# Patient Record
Sex: MALE | Race: WHITE | Employment: OTHER | URBAN - METROPOLITAN AREA
[De-identification: names, ages, dates, MRNs, and addresses within clinical notes are randomized per-mention and may not be internally consistent; named-entity substitution may affect disease eponyms.]

---

## 2019-06-17 ENCOUNTER — APPOINTMENT (OUTPATIENT)
Dept: ULTRASOUND IMAGING | Age: 79
End: 2019-06-17
Payer: COMMERCIAL

## 2019-06-17 ENCOUNTER — APPOINTMENT (OUTPATIENT)
Dept: CT IMAGING | Age: 79
End: 2019-06-17
Payer: COMMERCIAL

## 2019-06-17 ENCOUNTER — HOSPITAL ENCOUNTER (EMERGENCY)
Age: 79
Discharge: HOME | End: 2019-06-17
Attending: EMERGENCY MEDICINE
Payer: COMMERCIAL

## 2019-06-17 ENCOUNTER — APPOINTMENT (OUTPATIENT)
Dept: GENERAL RADIOLOGY | Age: 79
End: 2019-06-17
Payer: COMMERCIAL

## 2019-06-17 VITALS
DIASTOLIC BLOOD PRESSURE: 71 MMHG | SYSTOLIC BLOOD PRESSURE: 139 MMHG | HEART RATE: 82 BPM | OXYGEN SATURATION: 93 % | RESPIRATION RATE: 16 BRPM | WEIGHT: 210 LBS | BODY MASS INDEX: 29.4 KG/M2 | HEIGHT: 71 IN | TEMPERATURE: 98.4 F

## 2019-06-17 DIAGNOSIS — N18.9 CHRONIC KIDNEY DISEASE, UNSPECIFIED CKD STAGE: ICD-10-CM

## 2019-06-17 DIAGNOSIS — I82.621 ARM DVT (DEEP VENOUS THROMBOEMBOLISM), ACUTE, RIGHT (HCC): Primary | ICD-10-CM

## 2019-06-17 DIAGNOSIS — J45.901 EXACERBATION OF ASTHMA, UNSPECIFIED ASTHMA SEVERITY, UNSPECIFIED WHETHER PERSISTENT: ICD-10-CM

## 2019-06-17 LAB
ALBUMIN SERPL-MCNC: 4 G/DL (ref 3.5–5.2)
ALP BLD-CCNC: 62 U/L (ref 40–129)
ALT SERPL-CCNC: 17 U/L (ref 0–40)
ANION GAP SERPL CALCULATED.3IONS-SCNC: 17 MMOL/L (ref 7–16)
AST SERPL-CCNC: 17 U/L (ref 0–39)
BASOPHILS ABSOLUTE: 0.03 E9/L (ref 0–0.2)
BASOPHILS RELATIVE PERCENT: 0.3 % (ref 0–2)
BILIRUB SERPL-MCNC: 0.8 MG/DL (ref 0–1.2)
BUN BLDV-MCNC: 37 MG/DL (ref 8–23)
CALCIUM SERPL-MCNC: 9.5 MG/DL (ref 8.6–10.2)
CHLORIDE BLD-SCNC: 95 MMOL/L (ref 98–107)
CO2: 23 MMOL/L (ref 22–29)
CREAT SERPL-MCNC: 1.9 MG/DL (ref 0.7–1.2)
EOSINOPHILS ABSOLUTE: 0.03 E9/L (ref 0.05–0.5)
EOSINOPHILS RELATIVE PERCENT: 0.3 % (ref 0–6)
GFR AFRICAN AMERICAN: 42
GFR NON-AFRICAN AMERICAN: 35 ML/MIN/1.73
GLUCOSE BLD-MCNC: 111 MG/DL (ref 74–99)
HCT VFR BLD CALC: 40.6 % (ref 37–54)
HEMOGLOBIN: 13.9 G/DL (ref 12.5–16.5)
IMMATURE GRANULOCYTES #: 0.05 E9/L
IMMATURE GRANULOCYTES %: 0.4 % (ref 0–5)
LYMPHOCYTES ABSOLUTE: 1.9 E9/L (ref 1.5–4)
LYMPHOCYTES RELATIVE PERCENT: 16.7 % (ref 20–42)
MCH RBC QN AUTO: 33.3 PG (ref 26–35)
MCHC RBC AUTO-ENTMCNC: 34.2 % (ref 32–34.5)
MCV RBC AUTO: 97.1 FL (ref 80–99.9)
MONOCYTES ABSOLUTE: 1.87 E9/L (ref 0.1–0.95)
MONOCYTES RELATIVE PERCENT: 16.4 % (ref 2–12)
NEUTROPHILS ABSOLUTE: 7.5 E9/L (ref 1.8–7.3)
NEUTROPHILS RELATIVE PERCENT: 65.9 % (ref 43–80)
PDW BLD-RTO: 11.6 FL (ref 11.5–15)
PLATELET # BLD: 280 E9/L (ref 130–450)
PMV BLD AUTO: 9.6 FL (ref 7–12)
POTASSIUM SERPL-SCNC: 4.2 MMOL/L (ref 3.5–5)
RBC # BLD: 4.18 E12/L (ref 3.8–5.8)
RBC # BLD: NORMAL 10*6/UL
SODIUM BLD-SCNC: 135 MMOL/L (ref 132–146)
TOTAL PROTEIN: 7.5 G/DL (ref 6.4–8.3)
TROPONIN: <0.01 NG/ML (ref 0–0.03)
WBC # BLD: 11.4 E9/L (ref 4.5–11.5)

## 2019-06-17 PROCEDURE — 6360000002 HC RX W HCPCS: Performed by: EMERGENCY MEDICINE

## 2019-06-17 PROCEDURE — 2580000003 HC RX 258: Performed by: EMERGENCY MEDICINE

## 2019-06-17 PROCEDURE — 85025 COMPLETE CBC W/AUTO DIFF WBC: CPT

## 2019-06-17 PROCEDURE — 70450 CT HEAD/BRAIN W/O DYE: CPT

## 2019-06-17 PROCEDURE — 73130 X-RAY EXAM OF HAND: CPT

## 2019-06-17 PROCEDURE — 71045 X-RAY EXAM CHEST 1 VIEW: CPT

## 2019-06-17 PROCEDURE — 94664 DEMO&/EVAL PT USE INHALER: CPT

## 2019-06-17 PROCEDURE — 6370000000 HC RX 637 (ALT 250 FOR IP): Performed by: EMERGENCY MEDICINE

## 2019-06-17 PROCEDURE — 96372 THER/PROPH/DIAG INJ SC/IM: CPT

## 2019-06-17 PROCEDURE — 80053 COMPREHEN METABOLIC PANEL: CPT

## 2019-06-17 PROCEDURE — 93005 ELECTROCARDIOGRAM TRACING: CPT | Performed by: EMERGENCY MEDICINE

## 2019-06-17 PROCEDURE — 84484 ASSAY OF TROPONIN QUANT: CPT

## 2019-06-17 PROCEDURE — 99284 EMERGENCY DEPT VISIT MOD MDM: CPT

## 2019-06-17 PROCEDURE — 73060 X-RAY EXAM OF HUMERUS: CPT

## 2019-06-17 PROCEDURE — 96374 THER/PROPH/DIAG INJ IV PUSH: CPT

## 2019-06-17 PROCEDURE — 93971 EXTREMITY STUDY: CPT

## 2019-06-17 PROCEDURE — 94640 AIRWAY INHALATION TREATMENT: CPT

## 2019-06-17 PROCEDURE — 73090 X-RAY EXAM OF FOREARM: CPT

## 2019-06-17 RX ORDER — METHYLPREDNISOLONE SODIUM SUCCINATE 125 MG/2ML
125 INJECTION, POWDER, LYOPHILIZED, FOR SOLUTION INTRAMUSCULAR; INTRAVENOUS ONCE
Status: COMPLETED | OUTPATIENT
Start: 2019-06-17 | End: 2019-06-17

## 2019-06-17 RX ORDER — IPRATROPIUM BROMIDE AND ALBUTEROL SULFATE 2.5; .5 MG/3ML; MG/3ML
1 SOLUTION RESPIRATORY (INHALATION)
Status: COMPLETED | OUTPATIENT
Start: 2019-06-17 | End: 2019-06-17

## 2019-06-17 RX ORDER — ALBUTEROL SULFATE 90 UG/1
2 AEROSOL, METERED RESPIRATORY (INHALATION) EVERY 6 HOURS PRN
Qty: 1 INHALER | Refills: 0 | Status: SHIPPED | OUTPATIENT
Start: 2019-06-17 | End: 2019-08-22

## 2019-06-17 RX ORDER — 0.9 % SODIUM CHLORIDE 0.9 %
500 INTRAVENOUS SOLUTION INTRAVENOUS ONCE
Status: COMPLETED | OUTPATIENT
Start: 2019-06-17 | End: 2019-06-17

## 2019-06-17 RX ORDER — PREDNISONE 20 MG/1
TABLET ORAL
Qty: 10 TABLET | Refills: 0 | Status: SHIPPED | OUTPATIENT
Start: 2019-06-17 | End: 2019-06-24

## 2019-06-17 RX ADMIN — SODIUM CHLORIDE 500 ML: 9 INJECTION, SOLUTION INTRAVENOUS at 22:54

## 2019-06-17 RX ADMIN — ENOXAPARIN SODIUM 100 MG: 100 INJECTION SUBCUTANEOUS at 22:56

## 2019-06-17 RX ADMIN — METHYLPREDNISOLONE SODIUM SUCCINATE 125 MG: 125 INJECTION, POWDER, FOR SOLUTION INTRAMUSCULAR; INTRAVENOUS at 22:02

## 2019-06-17 RX ADMIN — IPRATROPIUM BROMIDE AND ALBUTEROL SULFATE 1 AMPULE: .5; 3 SOLUTION RESPIRATORY (INHALATION) at 22:17

## 2019-06-17 RX ADMIN — IPRATROPIUM BROMIDE AND ALBUTEROL SULFATE 1 AMPULE: .5; 3 SOLUTION RESPIRATORY (INHALATION) at 22:14

## 2019-06-17 RX ADMIN — IPRATROPIUM BROMIDE AND ALBUTEROL SULFATE 1 AMPULE: .5; 3 SOLUTION RESPIRATORY (INHALATION) at 22:10

## 2019-06-17 ASSESSMENT — ENCOUNTER SYMPTOMS
CONSTIPATION: 0
WHEEZING: 0
COUGH: 0
VISUAL CHANGE: 0
EYE PAIN: 0
RHINORRHEA: 0
VOMITING: 0
SINUS PRESSURE: 0
EYE DISCHARGE: 0
SHORTNESS OF BREATH: 0
EYE REDNESS: 0
SORE THROAT: 0
NAUSEA: 0
BLOOD IN STOOL: 0
BACK PAIN: 0
ABDOMINAL PAIN: 0
DIARRHEA: 0

## 2019-06-17 ASSESSMENT — PAIN DESCRIPTION - PAIN TYPE: TYPE: ACUTE PAIN

## 2019-06-17 ASSESSMENT — PAIN DESCRIPTION - ORIENTATION: ORIENTATION: RIGHT

## 2019-06-17 ASSESSMENT — PAIN - FUNCTIONAL ASSESSMENT: PAIN_FUNCTIONAL_ASSESSMENT: ACTIVITIES ARE NOT PREVENTED

## 2019-06-17 ASSESSMENT — PAIN DESCRIPTION - FREQUENCY: FREQUENCY: CONTINUOUS

## 2019-06-17 ASSESSMENT — PAIN DESCRIPTION - DESCRIPTORS: DESCRIPTORS: PATIENT UNABLE TO DESCRIBE

## 2019-06-17 ASSESSMENT — PAIN DESCRIPTION - LOCATION: LOCATION: ARM

## 2019-06-17 ASSESSMENT — PAIN SCALES - GENERAL: PAINLEVEL_OUTOF10: 3

## 2019-06-17 ASSESSMENT — PAIN DESCRIPTION - ONSET: ONSET: SUDDEN

## 2019-06-18 LAB
EKG ATRIAL RATE: 65 BPM
EKG P AXIS: 8 DEGREES
EKG P-R INTERVAL: 188 MS
EKG Q-T INTERVAL: 396 MS
EKG QRS DURATION: 94 MS
EKG QTC CALCULATION (BAZETT): 411 MS
EKG R AXIS: -43 DEGREES
EKG T AXIS: 11 DEGREES
EKG VENTRICULAR RATE: 65 BPM

## 2019-06-18 PROCEDURE — 93010 ELECTROCARDIOGRAM REPORT: CPT | Performed by: INTERNAL MEDICINE

## 2019-06-18 NOTE — ED PROVIDER NOTES
Patient is a 70years old male with history of dementia, asthma, and HTN here with 3 days of right arm weakness and fatigue. Wife states starting yesterday patient also had a fever of about 101. She states today patient's right arm started swelling. Patient complains of right hand and arm pain. Patient states nothing makes her symptoms worse or better. Patient denies history of strokes/TIAs, history of heart problems, history of blood clots/PE/DVT, history of cancer, recent travel/surgeries/hospitalizations, falls, injuries, chills, sweats, chest pain/pressure, shortness of breath, change in vision/hearing, numbness/tingling, nausea/vomiting, abdominal pain, change in hearing/vision, loss of consciousness, headache, neck pain/stiffness, bowel/bladder dysfunction. Wife states that patient seems to be at his baseline. Patient states that he is right-handed. Extremity Weakness   Duration:  3 days  Chronicity:  New  Relieved by:  Nothing  Worsened by:  Nothing  Ineffective treatments:  None tried  Associated symptoms: arthralgias, fever and myalgias    Associated symptoms: no abdominal pain, no aphasia, no ataxia, no chest pain, no cough, no diarrhea, no difficulty walking, no dizziness, no dysuria, no falls, no frequency, no headaches, no loss of consciousness, no melena, no nausea, no near-syncope, no seizures, no shortness of breath, no syncope, no vision change and no vomiting        Review of Systems   Constitutional: Positive for fever. Negative for chills and diaphoresis. HENT: Negative for ear pain, hearing loss, rhinorrhea, sinus pressure and sore throat. Eyes: Negative for pain, discharge, redness and visual disturbance. Respiratory: Negative for cough, shortness of breath and wheezing. Cardiovascular: Negative for chest pain, syncope and near-syncope. Gastrointestinal: Negative for abdominal pain, blood in stool, constipation, diarrhea, melena, nausea and vomiting.    Genitourinary: Negative for dysuria, flank pain, frequency and hematuria. Musculoskeletal: Positive for arthralgias and myalgias. Negative for back pain, falls, neck pain and neck stiffness. Skin: Negative for rash and wound. Neurological: Negative for dizziness, seizures, loss of consciousness, syncope, weakness, light-headedness and headaches. Hematological: Negative for adenopathy. All other systems reviewed and are negative. Physical Exam   Constitutional: He is oriented to person, place, and time. He appears well-developed and well-nourished. HENT:   Head: Normocephalic and atraumatic. Head is without raccoon's eyes and without Hernandez's sign. Nose: Nose normal.   Mouth/Throat: Uvula is midline, oropharynx is clear and moist and mucous membranes are normal.   Eyes: Pupils are equal, round, and reactive to light. Conjunctivae, EOM and lids are normal.   Neck: Trachea normal and normal range of motion. Neck supple. No JVD present. Cardiovascular: Normal rate, regular rhythm and normal heart sounds. Pulmonary/Chest: Effort normal and breath sounds normal. No respiratory distress. He has no wheezes. He has no rales. Abdominal: Soft. Bowel sounds are normal. There is no tenderness. There is no rebound, no guarding and no CVA tenderness. Musculoskeletal: He exhibits no edema. Right hand swelling with mild erythema. Right upper extremity tenderness to palpation and pain with movement. .   Neurological: He is alert and oriented to person, place, and time. No cranial nerve deficit or sensory deficit. Coordination normal. GCS eye subscore is 4. GCS verbal subscore is 5. GCS motor subscore is 6. Alert and oriented x3. Sensation intact and equal bilateral upper and lower extremities. Muscle strength 5/5 bilateral lower and left upper extremity. Muscle strength 4/5 right upper extremity. Skin: Skin is warm and dry. Nursing note and vitals reviewed.       Procedures    MDM  Number of Diagnoses or Management Options  Arm DVT (deep venous thromboembolism), acute, right ([address]):   Chronic kidney disease, unspecified CKD stage:   Exacerbation of asthma, unspecified asthma severity, unspecified whether persistent:   Diagnosis management comments: Patient and wife state that they are from [place] and would like to be discharged home. They were offered admission to the hospital but refused. They state that patient always has had reduced kidney function. Patient's calculated CrCl is >35 ml/min. He is given a dose of Lovenox in the ED. Patient is started on Eliquis for his RUE DVT. He denies shortness of breath but had wheezing and was given treatment he is discharged in stable condition with instructions to follow-up with his PCP or return to ED anytime if his symptoms worsen. EKG Interpretation. EKG: This EKG is signed and interpreted by me. Rate: 65  Rhythm: Sinus  Interpretation: non-specific T-wave changes; LAD; LAFB  Comparison: no previous EKG            --------------------------------------------- PAST HISTORY ---------------------------------------------  Past Medical History:  has a past medical history of Asthma, Dementia, and Hypertension. Past Surgical History:  has no past surgical history on file. Social History:  reports that he has never smoked. He has never used smokeless tobacco. He reports that he drinks alcohol. Family History: family history is not on file. The patients home medications have been reviewed.     Allergies: Aspirin    -------------------------------------------------- RESULTS -------------------------------------------------  Labs:  Results for orders placed or performed during the hospital encounter of 06/17/19   CBC Auto Differential   Result Value Ref Range    WBC 11.4 4.5 - 11.5 E9/L    RBC 4.18 3.80 - 5.80 E12/L    Hemoglobin 13.9 12.5 - 16.5 g/dL    Hematocrit 40.6 37.0 - 54.0 %    MCV 97.1 80.0 - 99.9 fL    MCH 33.3 26.0 - 35.0 pg    MCHC 34.2 32.0 - 34.5 %    RDW 11.6 11.5 - 15.0 fL    Platelets 811 100 - 908 E9/L    MPV 9.6 7.0 - 12.0 fL    Neutrophils % 65.9 43.0 - 80.0 %    Immature Granulocytes % 0.4 0.0 - 5.0 %    Lymphocytes % 16.7 (L) 20.0 - 42.0 %    Monocytes % 16.4 (H) 2.0 - 12.0 %    Eosinophils % 0.3 0.0 - 6.0 %    Basophils % 0.3 0.0 - 2.0 %    Neutrophils # 7.50 (H) 1.80 - 7.30 E9/L    Immature Granulocytes # 0.05 E9/L    Lymphocytes # 1.90 1.50 - 4.00 E9/L    Monocytes # 1.87 (H) 0.10 - 0.95 E9/L    Eosinophils # 0.03 (L) 0.05 - 0.50 E9/L    Basophils # 0.03 0.00 - 0.20 E9/L    RBC Morphology Normal    Comprehensive Metabolic Panel   Result Value Ref Range    Sodium 135 132 - 146 mmol/L    Potassium 4.2 3.5 - 5.0 mmol/L    Chloride 95 (L) 98 - 107 mmol/L    CO2 23 22 - 29 mmol/L    Anion Gap 17 (H) 7 - 16 mmol/L    Glucose 111 (H) 74 - 99 mg/dL    BUN 37 (H) 8 - 23 mg/dL    CREATININE 1.9 (H) 0.7 - 1.2 mg/dL    GFR Non-African American 35 >=60 mL/min/1.73    GFR African American 42     Calcium 9.5 8.6 - 10.2 mg/dL    Total Protein 7.5 6.4 - 8.3 g/dL    Alb 4.0 3.5 - 5.2 g/dL    Total Bilirubin 0.8 0.0 - 1.2 mg/dL    Alkaline Phosphatase 62 40 - 129 U/L    ALT 17 0 - 40 U/L    AST 17 0 - 39 U/L   Troponin   Result Value Ref Range    Troponin <0.01 0.00 - 0.03 ng/mL   EKG 12 Lead   Result Value Ref Range    Ventricular Rate 65 BPM    Atrial Rate 65 BPM    P-R Interval 188 ms    QRS Duration 94 ms    Q-T Interval 396 ms    QTc Calculation (Bazett) 411 ms    P Axis 8 degrees    R Axis -43 degrees    T Axis 11 degrees       Radiology:  XR HAND RIGHT (MIN 3 VIEWS)   Final Result      No fracture or dislocation. XR RADIUS ULNA RIGHT (2 VIEWS)   Final Result   No significant abnormal findings at the right humerus or at the right   forearm. XR HUMERUS RIGHT (MIN 2 VIEWS)   Final Result   No significant abnormal findings at the right humerus or at the right   forearm.       XR CHEST PORTABLE   Final Result No airspace opacities or pleural effusion. CT Head WO Contrast   Final Result   Atrophy and mild chronic microvascular ischemic disease. Inflammatory   changes in the paranasal sinuses as noted. US DUP UPPER EXTREMITY RIGHT VENOUS   Final Result   Occlusive thrombus in the right brachial vein.             ------------------------- NURSING NOTES AND VITALS REVIEWED ---------------------------  Date / Time Roomed:  6/17/2019  8:26 PM  ED Bed Assignment:  12/12    The nursing notes within the ED encounter and vital signs as below have been reviewed. /71   Pulse 82   Temp 98.4 °F (36.9 °C) (Oral)   Resp 16   Ht 5' 11\" (1.803 m)   Wt 210 lb (95.3 kg)   SpO2 93%   BMI 29.29 kg/m²   Oxygen Saturation Interpretation: Normal      ------------------------------------------ PROGRESS NOTES ------------------------------------------  10:11 PM  Patient states he feels better. He states he would like to be discharged. 10:46 PM  I have spoken with the patient and discussed todays results, in addition to providing specific details for the plan of care and counseling regarding the diagnosis and prognosis. Their questions are answered at this time and they are agreeable with the plan. I discussed at length with them reasons for immediate return here for re evaluation. They will followup with their primary care physician by calling their office tomorrow and on Monday.      --------------------------------- ADDITIONAL PROVIDER NOTES ---------------------------------  At this time the patient is without objective evidence of an acute process requiring hospitalization or inpatient management. They have remained hemodynamically stable throughout their entire ED visit and are stable for discharge with outpatient follow-up. The plan has been discussed in detail and they are aware of the specific conditions for emergent return, as well as the importance of follow-up.       Discharge Medication List as of 6/17/2019 11:32 PM      START taking these medications    Details   albuterol sulfate HFA (PROAIR HFA) 108 (90 Base) MCG/ACT inhaler Inhale 2 puffs into the lungs every 6 hours as needed for Wheezing, Disp-1 Inhaler, R-0Print      predniSONE (DELTASONE) 20 MG tablet Take 40mg daily for 4 days, Disp-10 tablet, R-0Print      apixaban (ELIQUIS STARTER PACK) 5 MG TABS tablet Take 10 mg (2 tablets) orally twice daily for 7 days, then take 5 mg (1 tablet) orally twice daily thereafter., Disp-74 tablet, R-0Print             Diagnosis:  1. Arm DVT (deep venous thromboembolism), acute, right ([facility] [address]    2. Exacerbation of asthma, unspecified asthma severity, unspecified whether persistent    3. Chronic kidney disease, unspecified CKD stage        Disposition:  Patient's disposition: Discharge to home  Patient's condition is stable.          Brianna Michael,   Resident  06/18/19 [id number]

## 2019-07-11 ENCOUNTER — OFFICE VISIT (OUTPATIENT)
Dept: VASCULAR SURGERY | Age: 79
End: 2019-07-11
Payer: COMMERCIAL

## 2019-07-11 VITALS — DIASTOLIC BLOOD PRESSURE: 64 MMHG | HEART RATE: 60 BPM | SYSTOLIC BLOOD PRESSURE: 108 MMHG

## 2019-07-11 DIAGNOSIS — I82.621 ACUTE DEEP VEIN THROMBOSIS (DVT) OF BRACHIAL VEIN OF RIGHT UPPER EXTREMITY (HCC): ICD-10-CM

## 2019-07-11 PROCEDURE — 99204 OFFICE O/P NEW MOD 45 MIN: CPT | Performed by: SURGERY

## 2019-07-11 NOTE — PROGRESS NOTES
 Transportation needs:     Medical: Not on file     Non-medical: Not on file   Tobacco Use    Smoking status: Never Smoker    Smokeless tobacco: Never Used   Substance and Sexual Activity    Alcohol use: Yes     Drinks per session: 1 or 2     Binge frequency: Weekly     Comment: wine    Drug use: Not on file    Sexual activity: Not on file   Lifestyle    Physical activity:     Days per week: Not on file     Minutes per session: Not on file    Stress: Not on file   Relationships    Social connections:     Talks on phone: Not on file     Gets together: Not on file     Attends Restorationism service: Not on file     Active member of club or organization: Not on file     Attends meetings of clubs or organizations: Not on file     Relationship status: Not on file    Intimate partner violence:     Fear of current or ex partner: Not on file     Emotionally abused: Not on file     Physically abused: Not on file     Forced sexual activity: Not on file   Other Topics Concern    Not on file   Social History Narrative    Not on file       Review of Systems:  Skin:  No abnormal pigmentation or rash  Eyes:  No blurring, diplopia or vision loss  Ears/Nose/Throat:  No hearing loss or vertigo  Respiratory:  No cough, pleuritic chest pain, dyspnea, or wheezing. Cardiovascular: No angina, palpitations . Gastrointestinal:  No nausea or vomiting; no abdominal pain or rectal bleeding  Musculoskeletal:  No arthritis or weakness. Neurologic:  No paralysis, paresis, paresthesia, seizures or headaches  Hematologic/Lymphatic/Immunologic:  No anemia, abnormal bleeding/bruising, fever, chills or night sweats. Endocrine:  No heat or cold intolerance. No polyphagia, polydipsia or polyuria. Physical Exam:  General appearance:  Alert, awake, oriented x 3. No distress. Skin:  Warm and dry  Head:  Normocephalic.   No masses, lesions or tenderness  Eyes:  Conjunctivae appear normal; PERRL  Ears:  External ears normal  Nose/Sinuses: if symptoms worsen or fail to improve.

## 2019-08-07 ENCOUNTER — APPOINTMENT (OUTPATIENT)
Dept: ULTRASOUND IMAGING | Age: 79
End: 2019-08-07
Payer: MEDICARE

## 2019-08-07 ENCOUNTER — HOSPITAL ENCOUNTER (EMERGENCY)
Age: 79
Discharge: HOME | End: 2019-08-07
Attending: EMERGENCY MEDICINE
Payer: MEDICARE

## 2019-08-07 VITALS
HEIGHT: 71 IN | RESPIRATION RATE: 16 BRPM | WEIGHT: 210 LBS | SYSTOLIC BLOOD PRESSURE: 125 MMHG | OXYGEN SATURATION: 94 % | DIASTOLIC BLOOD PRESSURE: 78 MMHG | HEART RATE: 57 BPM | TEMPERATURE: 97.3 F | BODY MASS INDEX: 29.4 KG/M2

## 2019-08-07 DIAGNOSIS — M79.601 RIGHT ARM PAIN: Primary | ICD-10-CM

## 2019-08-07 DIAGNOSIS — M10.9 ACUTE GOUT OF RIGHT WRIST, UNSPECIFIED CAUSE: ICD-10-CM

## 2019-08-07 LAB
ANION GAP SERPL CALCULATED.3IONS-SCNC: 10 MMOL/L (ref 7–16)
APTT: 34.3 SEC (ref 24.5–35.1)
BASOPHILS ABSOLUTE: 0.05 E9/L (ref 0–0.2)
BASOPHILS RELATIVE PERCENT: 0.6 % (ref 0–2)
BUN BLDV-MCNC: 18 MG/DL (ref 8–23)
C-REACTIVE PROTEIN: 6.5 MG/DL (ref 0–0.4)
CALCIUM SERPL-MCNC: 9.4 MG/DL (ref 8.6–10.2)
CHLORIDE BLD-SCNC: 103 MMOL/L (ref 98–107)
CO2: 29 MMOL/L (ref 22–29)
CREAT SERPL-MCNC: 1.4 MG/DL (ref 0.7–1.2)
EOSINOPHILS ABSOLUTE: 0.25 E9/L (ref 0.05–0.5)
EOSINOPHILS RELATIVE PERCENT: 3.1 % (ref 0–6)
GFR AFRICAN AMERICAN: 60
GFR NON-AFRICAN AMERICAN: 49 ML/MIN/1.73
GLUCOSE BLD-MCNC: 84 MG/DL (ref 74–99)
HCT VFR BLD CALC: 38.6 % (ref 37–54)
HEMOGLOBIN: 12.5 G/DL (ref 12.5–16.5)
IMMATURE GRANULOCYTES #: 0.02 E9/L
IMMATURE GRANULOCYTES %: 0.3 % (ref 0–5)
INR BLD: 1.3
LYMPHOCYTES ABSOLUTE: 1.7 E9/L (ref 1.5–4)
LYMPHOCYTES RELATIVE PERCENT: 21.3 % (ref 20–42)
MCH RBC QN AUTO: 32.1 PG (ref 26–35)
MCHC RBC AUTO-ENTMCNC: 32.4 % (ref 32–34.5)
MCV RBC AUTO: 99.2 FL (ref 80–99.9)
MONOCYTES ABSOLUTE: 1.22 E9/L (ref 0.1–0.95)
MONOCYTES RELATIVE PERCENT: 15.3 % (ref 2–12)
NEUTROPHILS ABSOLUTE: 4.75 E9/L (ref 1.8–7.3)
NEUTROPHILS RELATIVE PERCENT: 59.4 % (ref 43–80)
PDW BLD-RTO: 12.3 FL (ref 11.5–15)
PLATELET # BLD: 270 E9/L (ref 130–450)
PMV BLD AUTO: 9.8 FL (ref 7–12)
POTASSIUM REFLEX MAGNESIUM: 4.2 MMOL/L (ref 3.5–5)
PROTHROMBIN TIME: 14.8 SEC (ref 9.3–12.4)
RBC # BLD: 3.89 E12/L (ref 3.8–5.8)
SEDIMENTATION RATE, ERYTHROCYTE: 60 MM/HR (ref 0–15)
SODIUM BLD-SCNC: 142 MMOL/L (ref 132–146)
URIC ACID, SERUM: 6.8 MG/DL (ref 3.4–7)
WBC # BLD: 8 E9/L (ref 4.5–11.5)

## 2019-08-07 PROCEDURE — 86140 C-REACTIVE PROTEIN: CPT

## 2019-08-07 PROCEDURE — 85610 PROTHROMBIN TIME: CPT

## 2019-08-07 PROCEDURE — 85730 THROMBOPLASTIN TIME PARTIAL: CPT

## 2019-08-07 PROCEDURE — 85025 COMPLETE CBC W/AUTO DIFF WBC: CPT

## 2019-08-07 PROCEDURE — 93971 EXTREMITY STUDY: CPT

## 2019-08-07 PROCEDURE — 84550 ASSAY OF BLOOD/URIC ACID: CPT

## 2019-08-07 PROCEDURE — 85651 RBC SED RATE NONAUTOMATED: CPT

## 2019-08-07 PROCEDURE — 80048 BASIC METABOLIC PNL TOTAL CA: CPT

## 2019-08-07 PROCEDURE — 99284 EMERGENCY DEPT VISIT MOD MDM: CPT

## 2019-08-07 RX ORDER — PREDNISONE 10 MG/1
10 TABLET ORAL DAILY
Qty: 5 TABLET | Refills: 0 | Status: SHIPPED | OUTPATIENT
Start: 2019-08-07 | End: 2019-08-12

## 2019-08-21 DIAGNOSIS — I82.621 ACUTE DEEP VEIN THROMBOSIS (DVT) OF BRACHIAL VEIN OF RIGHT UPPER EXTREMITY (HCC): Primary | ICD-10-CM

## 2019-08-22 ENCOUNTER — OFFICE VISIT (OUTPATIENT)
Dept: VASCULAR SURGERY | Age: 79
End: 2019-08-22
Payer: COMMERCIAL

## 2019-08-22 DIAGNOSIS — Z86.718 HISTORY OF DEEP VEIN THROMBOSIS: ICD-10-CM

## 2019-08-22 PROCEDURE — 99213 OFFICE O/P EST LOW 20 MIN: CPT | Performed by: SURGERY

## 2020-02-13 ENCOUNTER — TELEPHONE (OUTPATIENT)
Dept: PHARMACY | Facility: CLINIC | Age: 80
End: 2020-02-13

## 2020-05-21 ENCOUNTER — TELEPHONE (OUTPATIENT)
Dept: PHARMACY | Facility: CLINIC | Age: 80
End: 2020-05-21

## 2021-01-31 ENCOUNTER — IMMUNIZATION (OUTPATIENT)
Dept: PRIMARY CARE CLINIC | Age: 81
End: 2021-01-31
Payer: MEDICARE

## 2021-01-31 PROCEDURE — 91300 COVID-19, PFIZER VACCINE 30MCG/0.3ML DOSE: CPT | Performed by: PHYSICIAN ASSISTANT

## 2021-01-31 PROCEDURE — 0001A COVID-19, PFIZER VACCINE 30MCG/0.3ML DOSE: CPT | Performed by: PHYSICIAN ASSISTANT

## 2021-02-23 ENCOUNTER — IMMUNIZATION (OUTPATIENT)
Dept: PRIMARY CARE CLINIC | Age: 81
End: 2021-02-23
Payer: MEDICARE

## 2021-02-23 PROCEDURE — 91300 COVID-19, PFIZER VACCINE 30MCG/0.3ML DOSE: CPT | Performed by: CLINICAL NURSE SPECIALIST

## 2021-02-23 PROCEDURE — 0002A COVID-19, PFIZER VACCINE 30MCG/0.3ML DOSE: CPT | Performed by: CLINICAL NURSE SPECIALIST

## 2022-08-13 ENCOUNTER — APPOINTMENT (OUTPATIENT)
Dept: GENERAL RADIOLOGY | Age: 82
End: 2022-08-13
Payer: MEDICARE

## 2022-08-13 ENCOUNTER — HOSPITAL ENCOUNTER (EMERGENCY)
Age: 82
Discharge: HOME | End: 2022-08-13
Attending: EMERGENCY MEDICINE
Payer: MEDICARE

## 2022-08-13 ENCOUNTER — APPOINTMENT (OUTPATIENT)
Dept: ULTRASOUND IMAGING | Age: 82
End: 2022-08-13
Payer: MEDICARE

## 2022-08-13 VITALS
HEART RATE: 67 BPM | TEMPERATURE: 97.1 F | SYSTOLIC BLOOD PRESSURE: 129 MMHG | WEIGHT: 200 LBS | RESPIRATION RATE: 20 BRPM | BODY MASS INDEX: 28 KG/M2 | HEIGHT: 71 IN | DIASTOLIC BLOOD PRESSURE: 81 MMHG | OXYGEN SATURATION: 97 %

## 2022-08-13 DIAGNOSIS — L03.114 CELLULITIS OF LEFT UPPER EXTREMITY: Primary | ICD-10-CM

## 2022-08-13 LAB
EKG ATRIAL RATE: 62 BPM
EKG P AXIS: 53 DEGREES
EKG P-R INTERVAL: 180 MS
EKG Q-T INTERVAL: 402 MS
EKG QRS DURATION: 88 MS
EKG QTC CALCULATION (BAZETT): 408 MS
EKG R AXIS: 109 DEGREES
EKG T AXIS: 2 DEGREES
EKG VENTRICULAR RATE: 62 BPM

## 2022-08-13 PROCEDURE — 2580000003 HC RX 258: Performed by: EMERGENCY MEDICINE

## 2022-08-13 PROCEDURE — 99284 EMERGENCY DEPT VISIT MOD MDM: CPT

## 2022-08-13 PROCEDURE — 6360000002 HC RX W HCPCS: Performed by: EMERGENCY MEDICINE

## 2022-08-13 PROCEDURE — 73110 X-RAY EXAM OF WRIST: CPT

## 2022-08-13 PROCEDURE — 93005 ELECTROCARDIOGRAM TRACING: CPT | Performed by: EMERGENCY MEDICINE

## 2022-08-13 PROCEDURE — 93971 EXTREMITY STUDY: CPT

## 2022-08-13 PROCEDURE — 96374 THER/PROPH/DIAG INJ IV PUSH: CPT

## 2022-08-13 PROCEDURE — 6370000000 HC RX 637 (ALT 250 FOR IP): Performed by: EMERGENCY MEDICINE

## 2022-08-13 RX ORDER — CEFDINIR 300 MG/1
300 CAPSULE ORAL 2 TIMES DAILY
Qty: 20 CAPSULE | Refills: 0 | Status: SHIPPED | OUTPATIENT
Start: 2022-08-13 | End: 2022-08-23

## 2022-08-13 RX ORDER — SULFAMETHOXAZOLE AND TRIMETHOPRIM 800; 160 MG/1; MG/1
1 TABLET ORAL ONCE
Status: COMPLETED | OUTPATIENT
Start: 2022-08-13 | End: 2022-08-13

## 2022-08-13 RX ORDER — SULFAMETHOXAZOLE AND TRIMETHOPRIM 800; 160 MG/1; MG/1
1 TABLET ORAL 2 TIMES DAILY
Qty: 20 TABLET | Refills: 0 | Status: SHIPPED | OUTPATIENT
Start: 2022-08-13 | End: 2022-08-23

## 2022-08-13 RX ADMIN — WATER 2000 MG: 1 INJECTION INTRAMUSCULAR; INTRAVENOUS; SUBCUTANEOUS at 13:46

## 2022-08-13 RX ADMIN — SULFAMETHOXAZOLE AND TRIMETHOPRIM 1 TABLET: 800; 160 TABLET ORAL at 13:47

## 2022-08-13 ASSESSMENT — ENCOUNTER SYMPTOMS
NAUSEA: 0
SHORTNESS OF BREATH: 0
DIARRHEA: 0
ABDOMINAL PAIN: 0
WHEEZING: 0
SORE THROAT: 0
BACK PAIN: 0
EYE PAIN: 0
VOMITING: 0
SINUS PRESSURE: 0
EYE REDNESS: 0
COUGH: 0
EYE DISCHARGE: 0

## 2022-08-13 ASSESSMENT — PAIN - FUNCTIONAL ASSESSMENT
PAIN_FUNCTIONAL_ASSESSMENT: NONE - DENIES PAIN
PAIN_FUNCTIONAL_ASSESSMENT: NONE - DENIES PAIN

## 2022-08-13 NOTE — ED PROVIDER NOTES
This patient with history of dementia. He is accompanied by his wife today. She reports this morning, she noticed his left wrist was swollen, red and tender. No reported trauma. No recent illness or injury otherwise. Remote history of deep vein thrombosis. No longer on anticoagulation. During triage, pulse oximetry detected bradycardia. However, further exam demonstrated the initial bradycardia was in error. Arm Injury  Location:  Wrist  Wrist location:  L wrist  Injury: no    Pain details:     Quality:  Aching    Radiates to:  Does not radiate    Severity:  Mild    Onset quality:  Sudden    Duration:  1 day    Timing:  Constant    Progression:  Worsening  Relieved by:  Immobilization  Worsened by: Movement  Ineffective treatments:  None tried  Associated symptoms: decreased range of motion    Associated symptoms: no back pain, no fatigue, no fever, no muscle weakness, no neck pain, no numbness and no stiffness       Review of Systems   Constitutional:  Negative for chills, fatigue and fever. HENT:  Negative for ear pain, sinus pressure and sore throat. Eyes:  Negative for pain, discharge and redness. Respiratory:  Negative for cough, shortness of breath and wheezing. Cardiovascular:  Negative for chest pain. Gastrointestinal:  Negative for abdominal pain, diarrhea, nausea and vomiting. Genitourinary:  Negative for dysuria and frequency. Musculoskeletal:  Positive for arthralgias and joint swelling. Negative for back pain, neck pain and stiffness. Skin:  Negative for rash and wound. Neurological:  Negative for weakness and headaches. Hematological:  Negative for adenopathy. All other systems reviewed and are negative. Physical Exam  Vitals and nursing note reviewed. Constitutional:       Appearance: He is well-developed. HENT:      Head: Normocephalic and atraumatic. Eyes:      Pupils: Pupils are equal, round, and reactive to light.    Cardiovascular:      Rate and Rhythm: Normal rate and regular rhythm. Heart sounds: Normal heart sounds. No murmur heard. Pulmonary:      Effort: Pulmonary effort is normal. No respiratory distress. Breath sounds: Normal breath sounds. No wheezing or rales. Abdominal:      General: Bowel sounds are normal.      Palpations: Abdomen is soft. Tenderness: There is no abdominal tenderness. There is no guarding or rebound. Musculoskeletal:         General: Swelling and tenderness present. Cervical back: Normal range of motion and neck supple. Comments: There is notable erythema and mild swelling to the left wrist.  Significant tenderness to palpation. Good distal pulse and capillary refill. The remainder of the arm is unremarkable. Skin:     General: Skin is warm and dry. Neurological:      Mental Status: He is alert. Cranial Nerves: No cranial nerve deficit. Coordination: Coordination normal.        Procedures     MDM     EKG: This EKG is signed and interpreted by me. Rate: 62  Rhythm: Sinus and frequent PVCs  Interpretation: no acute changes  Comparison: stable as compared to patient's most recent EKG             --------------------------------------------- PAST HISTORY ---------------------------------------------  Past Medical History:  has a past medical history of Arm DVT (deep venous thromboembolism), acute, right ([address] Asthma, Dementia ([facility] [address] History of deep vein thrombosis, and Hypertension. Past Surgical History:  has no past surgical history on file. Social History:  reports that he has never smoked. He has never used smokeless tobacco. He reports current alcohol use. He reports that he does not use drugs. Family History: family history is not on file. The patients home medications have been reviewed.     Allergies: Aspirin    -------------------------------------------------- RESULTS -------------------------------------------------  Labs:  Results for orders placed or performed during the hospital encounter of 08/13/22   EKG 12 Lead   Result Value Ref Range    Ventricular Rate 62 BPM    Atrial Rate 62 BPM    P-R Interval 180 ms    QRS Duration 88 ms    Q-T Interval 402 ms    QTc Calculation (Bazett) 408 ms    P Axis 53 degrees    R Axis 109 degrees    T Axis 2 degrees       Radiology:  XR WRIST LEFT (MIN 3 VIEWS)   Final Result   1. There is no fracture or dislocation of the left wrist   2. Degenerative changes of the left wrist.         US DUP UPPER EXTREMITY LEFT VENOUS   Final Result   No evidence of DVT.             ------------------------- NURSING NOTES AND VITALS REVIEWED ---------------------------  Date / Time Roomed:  8/13/2022 12:17 PM  ED Bed Assignment:  19/19    The nursing notes within the ED encounter and vital signs as below have been reviewed. BP (!) 118/47   Pulse (!) 36   Temp 97.1 °F (36.2 °C) (Temporal)   Resp 14   Ht 5' 11\" (1.803 m)   Wt 200 lb (90.7 kg)   SpO2 97%   BMI 27.89 kg/m²   Oxygen Saturation Interpretation: Normal      ------------------------------------------ PROGRESS NOTES ------------------------------------------  1:33 PM EDT  I have spoken with the patient and discussed todays results, in addition to providing specific details for the plan of care and counseling regarding the diagnosis and prognosis. Their questions are answered at this time and they are agreeable with the plan. I discussed at length with them reasons for immediate return here for re evaluation. They will followup with their primary care physician by calling their office on Monday.      --------------------------------- ADDITIONAL PROVIDER NOTES ---------------------------------  At this time the patient is without objective evidence of an acute process requiring hospitalization or inpatient management. They have remained hemodynamically stable throughout their entire ED visit and are stable for discharge with outpatient follow-up.      The plan has been

## 2022-08-13 NOTE — ED NOTES
Assumed care of pt. Pt had apparent hr in 35s on arrival however after EKG done in room, hr in 60s. Pt denies any cardiac sx. Pt arrives for left wrist swelling and erythema, denies trauma. AOx4, even respirations, calm/cooperative. Reports hx of upper extremity DVT, can't remember which arm, approx 2ya. Spouse at bedside. Pt takes 81asa daily, no other thinners. Denies fevers/n/v/recent illness. Steady on ambulation.  has examined pt.      Nilton Sesay RN  08/13/22 [id number]

## 2023-02-22 ENCOUNTER — HOSPITAL ENCOUNTER (INPATIENT)
Age: 83
LOS: 4 days | Discharge: HOME HEALTH | DRG: 641 | End: 2023-02-26
Attending: EMERGENCY MEDICINE | Admitting: INTERNAL MEDICINE
Payer: MEDICARE

## 2023-02-22 ENCOUNTER — APPOINTMENT (OUTPATIENT)
Dept: GENERAL RADIOLOGY | Age: 83
DRG: 641 | End: 2023-02-22
Payer: MEDICARE

## 2023-02-22 DIAGNOSIS — E87.0 DEHYDRATION WITH HYPERNATREMIA: Primary | ICD-10-CM

## 2023-02-22 DIAGNOSIS — E86.0 DEHYDRATION WITH HYPERNATREMIA: Primary | ICD-10-CM

## 2023-02-22 LAB
ALBUMIN SERPL-MCNC: 3.9 G/DL (ref 3.5–5.2)
ALP BLD-CCNC: 58 U/L (ref 40–129)
ALT SERPL-CCNC: 18 U/L (ref 0–40)
ANION GAP SERPL CALCULATED.3IONS-SCNC: 13 MMOL/L (ref 7–16)
AST SERPL-CCNC: 33 U/L (ref 0–39)
BACTERIA: ABNORMAL /HPF
BASOPHILS ABSOLUTE: 0 E9/L (ref 0–0.2)
BASOPHILS RELATIVE PERCENT: 0.2 % (ref 0–2)
BILIRUB SERPL-MCNC: 1.4 MG/DL (ref 0–1.2)
BILIRUBIN URINE: NEGATIVE
BLOOD, URINE: ABNORMAL
BUN BLDV-MCNC: 28 MG/DL (ref 6–23)
CALCIUM SERPL-MCNC: 10 MG/DL (ref 8.6–10.2)
CHLORIDE BLD-SCNC: 108 MMOL/L (ref 98–107)
CLARITY: CLEAR
CO2: 26 MMOL/L (ref 22–29)
COLOR: YELLOW
CREAT SERPL-MCNC: 1.4 MG/DL (ref 0.7–1.2)
EOSINOPHILS ABSOLUTE: 0 E9/L (ref 0.05–0.5)
EOSINOPHILS RELATIVE PERCENT: 0 % (ref 0–6)
GFR SERPL CREATININE-BSD FRML MDRD: 51 ML/MIN/1.73
GLUCOSE BLD-MCNC: 125 MG/DL (ref 74–99)
GLUCOSE BLD-MCNC: 126 MG/DL
GLUCOSE URINE: 250 MG/DL
HCT VFR BLD CALC: 45.1 % (ref 37–54)
HEMOGLOBIN: 14.9 G/DL (ref 12.5–16.5)
INFLUENZA A: NOT DETECTED
INFLUENZA B: NOT DETECTED
KETONES, URINE: ABNORMAL MG/DL
LACTIC ACID, SEPSIS: 1.8 MMOL/L (ref 0.5–1.9)
LACTIC ACID, SEPSIS: 2.8 MMOL/L (ref 0.5–1.9)
LEUKOCYTE ESTERASE, URINE: NEGATIVE
LIPASE: 16 U/L (ref 13–60)
LYMPHOCYTES ABSOLUTE: 1.14 E9/L (ref 1.5–4)
LYMPHOCYTES RELATIVE PERCENT: 9.7 % (ref 20–42)
MCH RBC QN AUTO: 32.4 PG (ref 26–35)
MCHC RBC AUTO-ENTMCNC: 33 % (ref 32–34.5)
MCV RBC AUTO: 98 FL (ref 80–99.9)
METER GLUCOSE: 126 MG/DL (ref 74–99)
MONOCYTES ABSOLUTE: 1.37 E9/L (ref 0.1–0.95)
MONOCYTES RELATIVE PERCENT: 11.5 % (ref 2–12)
NEUTROPHILS ABSOLUTE: 9.01 E9/L (ref 1.8–7.3)
NEUTROPHILS RELATIVE PERCENT: 78.8 % (ref 43–80)
NITRITE, URINE: NEGATIVE
OVALOCYTES: ABNORMAL
PDW BLD-RTO: 12.4 FL (ref 11.5–15)
PH UA: 5.5 (ref 5–9)
PLATELET # BLD: 221 E9/L (ref 130–450)
PMV BLD AUTO: 10.1 FL (ref 7–12)
POIKILOCYTES: ABNORMAL
POLYCHROMASIA: ABNORMAL
POTASSIUM SERPL-SCNC: 3.9 MMOL/L (ref 3.5–5)
PROTEIN UA: 30 MG/DL
RBC # BLD: 4.6 E12/L (ref 3.8–5.8)
RBC UA: ABNORMAL /HPF (ref 0–2)
SARS-COV-2 RNA, RT PCR: NOT DETECTED
SODIUM BLD-SCNC: 147 MMOL/L (ref 132–146)
SPECIFIC GRAVITY UA: 1.02 (ref 1–1.03)
TOTAL PROTEIN: 7.7 G/DL (ref 6.4–8.3)
UROBILINOGEN, URINE: 1 E.U./DL
WBC # BLD: 11.4 E9/L (ref 4.5–11.5)
WBC UA: ABNORMAL /HPF (ref 0–5)

## 2023-02-22 PROCEDURE — 82962 GLUCOSE BLOOD TEST: CPT

## 2023-02-22 PROCEDURE — 93005 ELECTROCARDIOGRAM TRACING: CPT | Performed by: EMERGENCY MEDICINE

## 2023-02-22 PROCEDURE — 2060000000 HC ICU INTERMEDIATE R&B

## 2023-02-22 PROCEDURE — 87040 BLOOD CULTURE FOR BACTERIA: CPT

## 2023-02-22 PROCEDURE — 80053 COMPREHEN METABOLIC PANEL: CPT

## 2023-02-22 PROCEDURE — 71045 X-RAY EXAM CHEST 1 VIEW: CPT

## 2023-02-22 PROCEDURE — P9612 CATHETERIZE FOR URINE SPEC: HCPCS

## 2023-02-22 PROCEDURE — 81001 URINALYSIS AUTO W/SCOPE: CPT

## 2023-02-22 PROCEDURE — 99285 EMERGENCY DEPT VISIT HI MDM: CPT

## 2023-02-22 PROCEDURE — 83605 ASSAY OF LACTIC ACID: CPT

## 2023-02-22 PROCEDURE — 96360 HYDRATION IV INFUSION INIT: CPT

## 2023-02-22 PROCEDURE — 2580000003 HC RX 258: Performed by: EMERGENCY MEDICINE

## 2023-02-22 PROCEDURE — 85025 COMPLETE CBC W/AUTO DIFF WBC: CPT

## 2023-02-22 PROCEDURE — 83690 ASSAY OF LIPASE: CPT

## 2023-02-22 PROCEDURE — 87636 SARSCOV2 & INF A&B AMP PRB: CPT

## 2023-02-22 RX ORDER — SODIUM CHLORIDE 450 MG/100ML
INJECTION, SOLUTION INTRAVENOUS ONCE
Status: COMPLETED | OUTPATIENT
Start: 2023-02-22 | End: 2023-02-23

## 2023-02-22 RX ORDER — 0.9 % SODIUM CHLORIDE 0.9 %
1000 INTRAVENOUS SOLUTION INTRAVENOUS ONCE
Status: COMPLETED | OUTPATIENT
Start: 2023-02-22 | End: 2023-02-22

## 2023-02-22 RX ADMIN — SODIUM CHLORIDE 1000 ML: 9 INJECTION, SOLUTION INTRAVENOUS at 18:58

## 2023-02-22 RX ADMIN — SODIUM CHLORIDE: 4.5 INJECTION, SOLUTION INTRAVENOUS at 20:25

## 2023-02-22 ASSESSMENT — PAIN - FUNCTIONAL ASSESSMENT: PAIN_FUNCTIONAL_ASSESSMENT: NONE - DENIES PAIN

## 2023-02-22 NOTE — LETTER
Leatha Mills : 1940 (82 yrs)    Address: [address], [address] Sex: Male   City: [address] [place] [address]         Marital Status:    Employer: [employer]         Sabianist: None   Primary Care Provider: Nina Gallagher MD         Primary Phone: [phone]   EMERGENCY CONTACT   Contact Name Legal Guardian? Relationship to Patient Home Phone Work Phone   1. SPENSERKAILUZ MARIA  2. KANCHAN MAST      Spouse  Child [phone]              GUARANTOR            Guarantor: Leatha Mills     : 1940   Address: [address] Sex: Male     [address] [address]     Relation to Patient: Self       Home Phone: [phone]   Guarantor ID: [id number]       Work Phone:     Guarantor Employer: [employer]         Status: RETIRED      COVERAGE        PRIMARY INSURANCE   [facility]: [facility] Plan: [employer] MEDICARE ADVANTAGE*   Payor Address: [place] [id number] [place] [id number]       Group Number: [id number] Insurance Type: [address] Name: Wallace Frost : 1940   Subscriber ID: [id number] Pat. Rel. to Sub: [address] [facility], [place] Medicaid  CERTIFICATION OF NECESSITY  FOR NON-EMERGENCY TRANSPORTATION   BY GROUND AMBULANCE      Individual Information   1. Name: Leatha Mills 2. [place] Medicaid Billing Number: [id number]     8. Address: [address] [place] [address]  [address] [address]      Transportation Provider Information   4. Provider Name: CRISTEL    5. [place] Medicaid Provider Number:   National Provider Identifier (NPI):       Certification  7. Criteria:  During transport, this individual requires:  [x] Medical treatment or continuous     supervision by an EMT. [] The administration or regulation of oxygen by another person. [] Supervised protective restraint. 8. Period Beginning Date:  2023   9. Length  [x] Not more than 1 day(s)  [] One Year     Additional Information Relevant to Certification   10.  Comments or Explanations, If Necessary or Appropriate     DEMENTIA, AT RISK FOR FALL, SEVERE DECONDITIONED AND UNABLE TO PIVOT AND AMBULATE AND SIT UP ALONE. Certifying Practitioner Information   11. Name of Practitioner: Humaira Rincon D.O.    [address] Medicaid Provider Number, If Applicable:   [address] Provider Identifier (NPI):       Signature Information   14. Date of Signature: 02/26/2023  15. Name of Person Signing[facility] [address] [facility]    16.  Signature and Professional Designation: Electronically signed by GUSTAVO Hobbs on 2/26/2023 at 9:10 AM'       OD [id number]  Rev. 7/2015

## 2023-02-22 NOTE — ED PROVIDER NOTES
[address]        Pt Name: Kimberly Garcia  MRN: [id number]  [place] 1940  Date of evaluation: 2/22/2023  Provider: Carlos Garcia DO  PCP: Corey Bueno MD  Note Started: 6:03 PM EST 2/22/23    CHIEF COMPLAINT       Chief Complaint   Patient presents with    Failure To Thrive     Patient sent from NH for not eating and drinking x 1 week. Patient also having weakness with ambulating        HISTORY OF PRESENT ILLNESS: 1 or more Elements        Limitations to history : Dementia history    Kimberly Garcia is a [de-identified] y.o. male who presents for not eating and drinking over the past 5 days. Patient has history of dementia and was recently transferred from [address] [place] [facility] the [facility], admitted to [facility] on 2/17/2023. Since arriving he has not been eating or drinking. Stafac concern for dehydration and patient was transported to ED for evaluation. Patient is unable to provide any information. I was able to contact patient's wife Monty Pope who states that patient has been doing well however since arriving to [facility] she agrees that he has not been eating or drinking. She feels that he is having some issues with swallowing. There has been no recent fevers, no recent vomiting or diarrhea. Nursing Notes were all reviewed and agreed with or any disagreements were addressed in the HPI. REVIEW OF EXTERNAL NOTE :       [place] [facility] [place] [facility] [place]      Positives and Pertinent negatives as per HPI. SURGICAL HISTORY   No past surgical history on file.     CURRENTMEDICATIONS       Previous Medications    ALBUTEROL (PROVENTIL) (2.5 MG/3ML) 0.083% NEBULIZER SOLUTION    INHALE THE CONTENTS OF 1 VIAL VIA NEBULIZER 4 TIMES DAILY    ALBUTEROL SULFATE HFA (PROAIR HFA) 108 (90 BASE) MCG/ACT INHALER    Inhale 2 puffs into the lungs every 6 hours as needed for Wheezing    AMLODIPINE (NORVASC) 5 MG TABLET    TAKE ONE TABLET BY MOUTH DAILY    BUDESONIDE (PULMICORT) 0.5 MG/2ML NEBULIZER SUSPENSION    Take 2 mLs by nebulization 2 times daily    DONEPEZIL (ARICEPT) 10 MG TABLET    Take 1 tablet by mouth nightly    MEMANTINE (NAMENDA) 5 MG TABLET    Take 1 tablet by mouth 2 times daily    MONTELUKAST (SINGULAIR) 10 MG TABLET    Take 1 tablet by mouth nightly    NEBIVOLOL (BYSTOLIC) 20 MG TABS TABLET    Take 1 tablet by mouth daily    QUINAPRIL (ACCUPRIL) 40 MG TABLET    Take 1 tablet by mouth nightly    VENTOLIN  (90 BASE) MCG/ACT INHALER    Inhale 2 puffs into the lungs every 6 hours as needed for Wheezing       ALLERGIES     Aspirin    FAMILYHISTORY     No family history on file. SOCIAL HISTORY       Social History     Tobacco Use    Smoking status: Never    Smokeless tobacco: Never   Vaping Use    Vaping Use: Never used   Substance Use Topics    Alcohol use: Yes     Comment: wine    Drug use: Never       SCREENINGS        [place] Coma Scale  Eye Opening: Spontaneous  Best Verbal Response: Confused  Best Motor Response: Obeys commands  [place] Coma Scale Score: 14                CIWA Assessment  BP: (!) 165/78  Heart Rate: 77           PHYSICAL EXAM  1 or more Elements     ED Triage Vitals   BP Temp Temp src Pulse Resp SpO2 Height Weight   -- -- -- -- -- -- -- --           Constitutional/General: Awake and alert, confused  Head: Normocephalic and atraumatic  Eyes: PERRL, EOMI, conjunctiva normal, sclera non icteric  ENT:  Oropharynx clear, handling secretions, no trismus, no asymmetry of the posterior oropharynx or uvular edema. Dry mucous membranes  Neck: Supple, full ROM, no stridor, no meningeal signs  Respiratory: Lungs clear to auscultation bilaterally, no wheezes, rales, or rhonchi. Not in respiratory distress  Cardiovascular:  Regular rate. Regular rhythm. No murmurs, no gallops, no rubs. 2+ distal pulses. Equal extremity pulses.    GI:  Abdomen Soft, Non tender, Non distended. +BS. No rebound, guarding, or rigidity. No pulsatile masses. Musculoskeletal: Moves all extremities x 4. Warm and well perfused, no clubbing, no cyanosis, no edema. Capillary refill <3 seconds  Integument: skin warm and dry. No rashes. Neurologic: GCS 14, no focal deficits, symmetric strength 5/5 in the upper and lower extremities bilaterally              DIAGNOSTIC RESULTS   LABS:    Labs Reviewed   CBC WITH AUTO DIFFERENTIAL - Abnormal; Notable for the following components:       Result Value    Lymphocytes % 9.7 (*)     Neutrophils Absolute 9.01 (*)     Lymphocytes Absolute 1.14 (*)     Monocytes Absolute 1.37 (*)     Eosinophils Absolute 0.00 (*)     All other components within normal limits   COMPREHENSIVE METABOLIC PANEL - Abnormal; Notable for the following components:    Sodium 147 (*)     Chloride 108 (*)     Glucose 125 (*)     BUN 28 (*)     Creatinine 1.4 (*)     Total Bilirubin 1.4 (*)     All other components within normal limits   LACTATE, SEPSIS - Abnormal; Notable for the following components:    Lactic Acid, Sepsis 2.8 (*)     All other components within normal limits   URINALYSIS - Abnormal; Notable for the following components:    Glucose, Ur 250 (*)     Ketones, Urine TRACE (*)     Blood, Urine SMALL (*)     Protein, UA 30 (*)     All other components within normal limits   POCT GLUCOSE - Abnormal; Notable for the following components:    Meter Glucose 126 (*)     All other components within normal limits   POCT GLUCOSE - Normal   COVID-19 & INFLUENZA COMBO   CULTURE, BLOOD 1   CULTURE, BLOOD 2   LIPASE   LACTATE, SEPSIS   MICROSCOPIC URINALYSIS       As interpreted by me, the above displayed labs are abnormal. All other labs obtained during this visit were within normal range or not returned as of this dictation. EKG: This EKG is signed and interpreted by me. Rate: 76  Rhythm: Sinus  Interpretation: Left axis deviation, normal intervals, no ST-T changes.   No findings suggestive of acute ischemia or injury  Comparison: changes compared to previous EKG      RADIOLOGY:   Non-plain film images such as CT, Ultrasound and MRI are read by the radiologist. Plain radiographic images are visualized and preliminarily interpreted by the ED Provider with the findings documented in the ED Course. Interpretation per the Radiologist below, if available at the time of this note:    XR CHEST PORTABLE   Final Result   No acute process. No results found. No results found. PROCEDURES   Unless otherwise noted below, none       CRITICAL CARE TIME (.cct)   None    PAST MEDICAL HISTORY/Chronic Conditions Affecting Care      has a past medical history of Arm DVT (deep venous thromboembolism), acute, right ([address] Asthma, Dementia ([facility] [address] History of deep vein thrombosis (8/22/2019), and Hypertension. EMERGENCY DEPARTMENT COURSE    Vitals:    Vitals:    02/22/23 1803 02/22/23 1923   BP: (!) 186/89 (!) 165/78   Pulse: 75 77   Resp: 18 16   Temp: 98 °F (36.7 °C)    SpO2: 95% 94%       Patient was given the following medications:  Medications   0.9 % sodium chloride bolus (0 mLs IntraVENous Stopped 2/22/23 2025)   0.45 % sodium chloride infusion ( IntraVENous New Bag 2/22/23 2025)             Medical Decision Making/Differential Diagnosis:    CC/HPI Summary, Social Determinants of health, Records Reviewed, DDx, testing done/not done, ED Course, Reassessment, disposition considerations/shared decision making with patient, consults, disposition:             Medical Decision Making  46-year-old male with history of dementia, recently transferred to [facility] on 2/17/2023 presenting with no eating or drinking since arriving at the facility. Facility a concern for dehydration. I was able to gather more information from the wife who feels that there has been difficulty with swallowing. Patient is unable to provide any information due to his advanced dementia.   Spoke with patient's wife who states that patient is [facility] and would never want a feeding tube. Although patient has not been eating or drinking, I related that there are other evaluations and testing that need to be performed before consideration for parenteral feeding. He does have dry mucous membranes which raises a concern for dehydration. Would have suspicion for electrolyte abnormalities and acute kidney injury. Possibility of infection as well with his change in eating habits. However it may be simply a new environment and dementia. Patient was given 1 L normal saline during evaluation. EKG shows no findings suggestive of acute ischemia or injury. No leukocytosis or anemia. Metabolic panel does show hyponatremia 147 and elevation in BUN at 28 and creatinine at 1.4, slight prerenal azotemia. Liver enzymes are unremarkable, lipase normal.  Patient's lactic acid is slightly elevated as well at 2.8. COVID and influenza testing are negative. Chest x-ray shows no acute abnormality. Urinalysis shows trace ketones but no suspicion of infection. With patient's hypernatremia he was switched to half-normal saline maintenance fluids. Patient's son is at bedside, agreeable with admission for further hydration and evaluation for his decreased eating and drinking. Patient remained hemodynamically stable during ED course. Problems Addressed:  Dehydration with hypernatremia: acute illness or injury    Amount and/or Complexity of Data Reviewed  Independent Historian: spouse  Labs: ordered. Decision-making details documented in ED Course. Radiology: ordered. Decision-making details documented in ED Course. ECG/medicine tests: ordered and independent interpretation performed. Decision-making details documented in ED Course. Risk  Prescription drug management. Decision regarding hospitalization.         EKG is ordered to have documentation of patient's current rhythm, and to rule out any obvious acute cardiac illnesses such as ACS. Additionally, QT interval may be of use in decision making regarding any medications administered here in the ED. Patient is placed on cardiac monitor and continuous pulse ox for monitoring. POCT glucose ordered to rule out acute hyperglycemia or hypoglycemia as a cause of symptoms. CBC is ordered to evaluate for any signs of infection or inflammation by obtaining a WBC count, or any signs of acute anemia by interpreting hemoglobin. CMP was ordered to evaluate for any electrolyte imbalances, kidney function, or any elevations in anion gap. Urinalysis ordered to evaluate for UTI as the possible cause of symptoms, and may also evaluate hematuria as well. Lipase levels were ordered to evaluate for possible elevations which suggest pancreatic etiology of symptoms. Viral swabs are ordered to evaluate possible viral etiology of symptoms. Chest x-ray is ordered to evaluate for any possible signs of pneumonia, pleural effusions, cardiomegaly, pneumothorax, atelectasis, rib or sternal abnormalities including fractures. CONSULTS: (Who and What was discussed)  IP CONSULT TO INTERNAL MEDICINE        I am the Primary Clinician of Record. FINAL IMPRESSION      1. Dehydration with hypernatremia          DISPOSITION/PLAN     DISPOSITION Decision To Admit 02/22/2023 07:50:17 PM      PATIENT REFERRED TO:  No follow-up provider specified.     DISCHARGE MEDICATIONS:  New Prescriptions    No medications on file       DISCONTINUED MEDICATIONS:  Discontinued Medications    No medications on file              (Please note that portions of this note were completed with a voice recognition program.  Efforts were made to edit the dictations but occasionally words are mis-transcribed.)    Seth Jacob DO (electronically signed)            Seth Jacob DO  02/22/23 2056

## 2023-02-23 ENCOUNTER — APPOINTMENT (OUTPATIENT)
Dept: MRI IMAGING | Age: 83
DRG: 641 | End: 2023-02-23
Payer: MEDICARE

## 2023-02-23 LAB
ANION GAP SERPL CALCULATED.3IONS-SCNC: 11 MMOL/L (ref 7–16)
BASOPHILS ABSOLUTE: 0.02 E9/L (ref 0–0.2)
BASOPHILS RELATIVE PERCENT: 0.2 % (ref 0–2)
BUN BLDV-MCNC: 21 MG/DL (ref 6–23)
CALCIUM SERPL-MCNC: 8.8 MG/DL (ref 8.6–10.2)
CHLORIDE BLD-SCNC: 109 MMOL/L (ref 98–107)
CO2: 25 MMOL/L (ref 22–29)
CREAT SERPL-MCNC: 1.2 MG/DL (ref 0.7–1.2)
EKG ATRIAL RATE: 76 BPM
EKG P AXIS: 18 DEGREES
EKG P-R INTERVAL: 174 MS
EKG Q-T INTERVAL: 408 MS
EKG QRS DURATION: 92 MS
EKG QTC CALCULATION (BAZETT): 459 MS
EKG R AXIS: -58 DEGREES
EKG T AXIS: 55 DEGREES
EKG VENTRICULAR RATE: 76 BPM
EOSINOPHILS ABSOLUTE: 0 E9/L (ref 0.05–0.5)
EOSINOPHILS RELATIVE PERCENT: 0 % (ref 0–6)
GFR SERPL CREATININE-BSD FRML MDRD: >60 ML/MIN/1.73
GLUCOSE BLD-MCNC: 127 MG/DL (ref 74–99)
HCT VFR BLD CALC: 40.1 % (ref 37–54)
HEMOGLOBIN: 13.4 G/DL (ref 12.5–16.5)
IMMATURE GRANULOCYTES #: 0.04 E9/L
IMMATURE GRANULOCYTES %: 0.4 % (ref 0–5)
LACTIC ACID: 1 MMOL/L (ref 0.5–2.2)
LYMPHOCYTES ABSOLUTE: 1.35 E9/L (ref 1.5–4)
LYMPHOCYTES RELATIVE PERCENT: 13.4 % (ref 20–42)
MAGNESIUM: 2 MG/DL (ref 1.6–2.6)
MCH RBC QN AUTO: 32.2 PG (ref 26–35)
MCHC RBC AUTO-ENTMCNC: 33.4 % (ref 32–34.5)
MCV RBC AUTO: 96.4 FL (ref 80–99.9)
MONOCYTES ABSOLUTE: 1.49 E9/L (ref 0.1–0.95)
MONOCYTES RELATIVE PERCENT: 14.7 % (ref 2–12)
NEUTROPHILS ABSOLUTE: 7.21 E9/L (ref 1.8–7.3)
NEUTROPHILS RELATIVE PERCENT: 71.3 % (ref 43–80)
PDW BLD-RTO: 12.4 FL (ref 11.5–15)
PLATELET # BLD: 185 E9/L (ref 130–450)
PMV BLD AUTO: 10.2 FL (ref 7–12)
POTASSIUM REFLEX MAGNESIUM: 3.5 MMOL/L (ref 3.5–5)
RBC # BLD: 4.16 E12/L (ref 3.8–5.8)
SODIUM BLD-SCNC: 145 MMOL/L (ref 132–146)
WBC # BLD: 10.1 E9/L (ref 4.5–11.5)

## 2023-02-23 PROCEDURE — 94640 AIRWAY INHALATION TREATMENT: CPT

## 2023-02-23 PROCEDURE — 94664 DEMO&/EVAL PT USE INHALER: CPT

## 2023-02-23 PROCEDURE — 6360000002 HC RX W HCPCS: Performed by: NURSE PRACTITIONER

## 2023-02-23 PROCEDURE — 83605 ASSAY OF LACTIC ACID: CPT

## 2023-02-23 PROCEDURE — 85025 COMPLETE CBC W/AUTO DIFF WBC: CPT

## 2023-02-23 PROCEDURE — 2060000000 HC ICU INTERMEDIATE R&B

## 2023-02-23 PROCEDURE — 80048 BASIC METABOLIC PNL TOTAL CA: CPT

## 2023-02-23 PROCEDURE — 93010 ELECTROCARDIOGRAM REPORT: CPT | Performed by: INTERNAL MEDICINE

## 2023-02-23 PROCEDURE — 70551 MRI BRAIN STEM W/O DYE: CPT

## 2023-02-23 PROCEDURE — 83735 ASSAY OF MAGNESIUM: CPT

## 2023-02-23 PROCEDURE — 6370000000 HC RX 637 (ALT 250 FOR IP): Performed by: NURSE PRACTITIONER

## 2023-02-23 PROCEDURE — 2580000003 HC RX 258: Performed by: NURSE PRACTITIONER

## 2023-02-23 RX ORDER — DONEPEZIL HYDROCHLORIDE 5 MG/1
10 TABLET, FILM COATED ORAL NIGHTLY
Status: DISCONTINUED | OUTPATIENT
Start: 2023-02-23 | End: 2023-02-26 | Stop reason: HOSPADM

## 2023-02-23 RX ORDER — AMLODIPINE BESYLATE 5 MG/1
5 TABLET ORAL DAILY
Status: DISCONTINUED | OUTPATIENT
Start: 2023-02-23 | End: 2023-02-26 | Stop reason: HOSPADM

## 2023-02-23 RX ORDER — QUETIAPINE FUMARATE 50 MG/1
50 TABLET, FILM COATED ORAL 2 TIMES DAILY
COMMUNITY

## 2023-02-23 RX ORDER — AMLODIPINE BESYLATE 5 MG/1
5 TABLET ORAL DAILY
COMMUNITY

## 2023-02-23 RX ORDER — ONDANSETRON 2 MG/ML
4 INJECTION INTRAMUSCULAR; INTRAVENOUS EVERY 6 HOURS PRN
Status: DISCONTINUED | OUTPATIENT
Start: 2023-02-23 | End: 2023-02-26 | Stop reason: HOSPADM

## 2023-02-23 RX ORDER — METOPROLOL SUCCINATE 100 MG/1
200 TABLET, EXTENDED RELEASE ORAL DAILY
Status: DISCONTINUED | OUTPATIENT
Start: 2023-02-23 | End: 2023-02-26 | Stop reason: HOSPADM

## 2023-02-23 RX ORDER — BISACODYL 10 MG
10 SUPPOSITORY, RECTAL RECTAL DAILY PRN
Status: DISCONTINUED | OUTPATIENT
Start: 2023-02-23 | End: 2023-02-26 | Stop reason: HOSPADM

## 2023-02-23 RX ORDER — ONDANSETRON 4 MG/1
4 TABLET, ORALLY DISINTEGRATING ORAL EVERY 8 HOURS PRN
Status: DISCONTINUED | OUTPATIENT
Start: 2023-02-23 | End: 2023-02-26 | Stop reason: HOSPADM

## 2023-02-23 RX ORDER — ALBUTEROL SULFATE 90 UG/1
2 AEROSOL, METERED RESPIRATORY (INHALATION) EVERY 4 HOURS PRN
COMMUNITY

## 2023-02-23 RX ORDER — CLOTRIMAZOLE 10 MG/1
10 LOZENGE ORAL; TOPICAL
Status: DISCONTINUED | OUTPATIENT
Start: 2023-02-23 | End: 2023-02-26 | Stop reason: HOSPADM

## 2023-02-23 RX ORDER — ACETAMINOPHEN 650 MG/1
650 SUPPOSITORY RECTAL EVERY 6 HOURS PRN
Status: DISCONTINUED | OUTPATIENT
Start: 2023-02-23 | End: 2023-02-26 | Stop reason: HOSPADM

## 2023-02-23 RX ORDER — NEBIVOLOL 20 MG/1
20 TABLET ORAL DAILY
Status: DISCONTINUED | OUTPATIENT
Start: 2023-02-23 | End: 2023-02-23 | Stop reason: CLARIF

## 2023-02-23 RX ORDER — HEPARIN SODIUM 10000 [USP'U]/ML
5000 INJECTION, SOLUTION INTRAVENOUS; SUBCUTANEOUS EVERY 8 HOURS
Status: DISCONTINUED | OUTPATIENT
Start: 2023-02-23 | End: 2023-02-26 | Stop reason: HOSPADM

## 2023-02-23 RX ORDER — ALBUTEROL SULFATE 2.5 MG/3ML
2.5 SOLUTION RESPIRATORY (INHALATION) 4 TIMES DAILY
Status: DISCONTINUED | OUTPATIENT
Start: 2023-02-23 | End: 2023-02-26 | Stop reason: HOSPADM

## 2023-02-23 RX ORDER — ALBUTEROL SULFATE 2.5 MG/3ML
2.5 SOLUTION RESPIRATORY (INHALATION) EVERY 6 HOURS PRN
Status: DISCONTINUED | OUTPATIENT
Start: 2023-02-23 | End: 2023-02-26 | Stop reason: HOSPADM

## 2023-02-23 RX ORDER — HYDROXYZINE PAMOATE 25 MG/1
25 CAPSULE ORAL 3 TIMES DAILY PRN
COMMUNITY

## 2023-02-23 RX ORDER — MONTELUKAST SODIUM 10 MG/1
10 TABLET ORAL NIGHTLY
Status: DISCONTINUED | OUTPATIENT
Start: 2023-02-23 | End: 2023-02-26 | Stop reason: HOSPADM

## 2023-02-23 RX ORDER — ACETAMINOPHEN 325 MG/1
650 TABLET ORAL EVERY 6 HOURS PRN
Status: DISCONTINUED | OUTPATIENT
Start: 2023-02-23 | End: 2023-02-26 | Stop reason: HOSPADM

## 2023-02-23 RX ORDER — ALBUTEROL SULFATE 90 UG/1
2 AEROSOL, METERED RESPIRATORY (INHALATION) EVERY 6 HOURS PRN
Status: DISCONTINUED | OUTPATIENT
Start: 2023-02-23 | End: 2023-02-23 | Stop reason: CLARIF

## 2023-02-23 RX ORDER — MULTIVITAMIN WITH IRON
1 TABLET ORAL DAILY
Status: DISCONTINUED | OUTPATIENT
Start: 2023-02-23 | End: 2023-02-26 | Stop reason: HOSPADM

## 2023-02-23 RX ORDER — SODIUM CHLORIDE 0.9 % (FLUSH) 0.9 %
5-40 SYRINGE (ML) INJECTION PRN
Status: DISCONTINUED | OUTPATIENT
Start: 2023-02-23 | End: 2023-02-26 | Stop reason: HOSPADM

## 2023-02-23 RX ORDER — MEMANTINE HYDROCHLORIDE 5 MG/1
5 TABLET ORAL 2 TIMES DAILY
Status: DISCONTINUED | OUTPATIENT
Start: 2023-02-23 | End: 2023-02-26 | Stop reason: HOSPADM

## 2023-02-23 RX ORDER — SODIUM CHLORIDE 9 MG/ML
INJECTION, SOLUTION INTRAVENOUS PRN
Status: DISCONTINUED | OUTPATIENT
Start: 2023-02-23 | End: 2023-02-26 | Stop reason: HOSPADM

## 2023-02-23 RX ORDER — SODIUM CHLORIDE 450 MG/100ML
INJECTION, SOLUTION INTRAVENOUS CONTINUOUS
Status: DISCONTINUED | OUTPATIENT
Start: 2023-02-23 | End: 2023-02-26 | Stop reason: HOSPADM

## 2023-02-23 RX ORDER — SODIUM CHLORIDE 0.9 % (FLUSH) 0.9 %
5-40 SYRINGE (ML) INJECTION EVERY 12 HOURS SCHEDULED
Status: DISCONTINUED | OUTPATIENT
Start: 2023-02-23 | End: 2023-02-26 | Stop reason: HOSPADM

## 2023-02-23 RX ORDER — BUDESONIDE 0.5 MG/2ML
500 INHALANT ORAL 2 TIMES DAILY
Status: DISCONTINUED | OUTPATIENT
Start: 2023-02-23 | End: 2023-02-26 | Stop reason: HOSPADM

## 2023-02-23 RX ADMIN — ALBUTEROL SULFATE 2.5 MG: 2.5 SOLUTION RESPIRATORY (INHALATION) at 16:07

## 2023-02-23 RX ADMIN — MEMANTINE 5 MG: 5 TABLET ORAL at 21:21

## 2023-02-23 RX ADMIN — BUDESONIDE 500 MCG: 0.5 SUSPENSION RESPIRATORY (INHALATION) at 08:37

## 2023-02-23 RX ADMIN — HEPARIN SODIUM 5000 UNITS: 10000 INJECTION INTRAVENOUS; SUBCUTANEOUS at 14:25

## 2023-02-23 RX ADMIN — SODIUM CHLORIDE: 4.5 INJECTION, SOLUTION INTRAVENOUS at 14:44

## 2023-02-23 RX ADMIN — BUDESONIDE 500 MCG: 0.5 SUSPENSION RESPIRATORY (INHALATION) at 19:54

## 2023-02-23 RX ADMIN — HEPARIN SODIUM 5000 UNITS: 10000 INJECTION INTRAVENOUS; SUBCUTANEOUS at 21:21

## 2023-02-23 RX ADMIN — ALBUTEROL SULFATE 2.5 MG: 2.5 SOLUTION RESPIRATORY (INHALATION) at 12:05

## 2023-02-23 RX ADMIN — ALBUTEROL SULFATE 2.5 MG: 2.5 SOLUTION RESPIRATORY (INHALATION) at 19:54

## 2023-02-23 RX ADMIN — DONEPEZIL HYDROCHLORIDE 10 MG: 5 TABLET ORAL at 21:22

## 2023-02-23 RX ADMIN — HEPARIN SODIUM 5000 UNITS: 10000 INJECTION INTRAVENOUS; SUBCUTANEOUS at 04:15

## 2023-02-23 RX ADMIN — ALBUTEROL SULFATE 2.5 MG: 2.5 SOLUTION RESPIRATORY (INHALATION) at 08:37

## 2023-02-23 RX ADMIN — SODIUM CHLORIDE: 4.5 INJECTION, SOLUTION INTRAVENOUS at 04:37

## 2023-02-23 ASSESSMENT — PAIN SCALES - GENERAL
PAINLEVEL_OUTOF10: 0
PAINLEVEL_OUTOF10: 0

## 2023-02-23 NOTE — PROGRESS NOTES
Phone called made to pt wife Surekha Ribeiro to complete MRI screening form with no answer. A message was left with reason for call and unit number for a return call. Will continue to attempt to complete.     Electronically signed by Doris Palacios RN on 2/23/2023 at 5:39 PM

## 2023-02-23 NOTE — ED NOTES
Pt ate broth and sherbert with wife feeding at bedside.  No coughing noted     Alissa Ireland, PARVEZ  02/23/23 4178

## 2023-02-23 NOTE — PROGRESS NOTES
Neurology consult sent to Dr. Jinny Rivera    Electronically signed by Roxann Eagle RN on 2/23/2023 at 3:55 PM

## 2023-02-23 NOTE — PROGRESS NOTES
SLP eval and treat ordered due to pt failing bedside swallow this am, and wife reporting history of pt pocketing food. Diet changed from regular to easy to chew until pt evaluated by SLP.     Electronically signed by Hadley Gonsalves RN on 2/23/2023 at 4:15 PM

## 2023-02-23 NOTE — ACP (ADVANCE CARE PLANNING)
Advance Care Planning   Healthcare Decision Maker:    Primary Decision Maker: Rito Power - [phone]    Secondary Decision Maker: Sachin  - Child - [phone]    Click here to complete Healthcare Decision Makers including selection of the Healthcare Decision Maker Relationship (ie \"Primary\").

## 2023-02-23 NOTE — H&P
Hospital Medicine History & Physical      PCP: Nina Gallagher MD    Date of Admission: 2/22/2023    Date of Service: . FEB 23, 2023    Chief Complaint:  CONFUSION      History Of Present Illness:     [de-identified] y.o. male presented with   CONFUSION, HE LIVES IN [facility], AND THE WIFE STATES THEY SPENT 2 MONTHS IN [place] WITH THEIR DAUGHTER, SHE NOTICED A MENTAL AND PHYSICAL DECLINE, THAT HAS GOTTEN PROGRESSIVELY WORSE, AND HE HAS DEVELOPED A TREMOR OF HIS HAND AND FEET. BROUGHT TO ER FOUND TO BE HYPERNATREMIC,   Past Medical History:          Diagnosis Date    Arm DVT (deep venous thromboembolism), acute, right (HCC)     Asthma     Dementia ([facility] [address]     History of deep vein thrombosis 8/22/2019    Hypertension        Past Surgical History:      History reviewed. No pertinent surgical history. Medications Prior to Admission:      Prior to Admission medications    Medication Sig Start Date End Date Taking?  Authorizing Provider   albuterol sulfate HFA (VENTOLIN HFA) 108 (90 Base) MCG/ACT inhaler Inhale 2 puffs into the lungs every 4 hours as needed for Wheezing   Yes Historical Provider, MD   amLODIPine (NORVASC) 5 MG tablet Take 5 mg by mouth daily   Yes Historical Provider, MD   QUEtiapine (SEROQUEL) 50 MG tablet Take 50 mg by mouth 2 times daily   Yes Historical Provider, MD   hydrOXYzine pamoate (VISTARIL) 25 MG capsule Take 25 mg by mouth 3 times daily as needed for Anxiety   Yes Historical Provider, MD   nebivolol (BYSTOLIC) 20 MG TABS tablet Take 1 tablet by mouth daily 4/8/20   Helder Worley MD   budesonide (PULMICORT) 0.5 MG/2ML nebulizer suspension Take 2 mLs by nebulization 2 times daily 1/14/20   Srinath Mcguire MD   quinapril (ACCUPRIL) 40 MG tablet Take 1 tablet by mouth nightly 11/26/19   Helder Worley MD   montelukast (SINGULAIR) 10 MG tablet Take 1 tablet by mouth nightly 11/26/19   Helder Worley MD   memantine (NAMENDA) 5 MG tablet Take 1 tablet by mouth 2 times daily 11/26/19   Helder Worley MD   donepezil (ARICEPT) 10 MG tablet Take 1 tablet by mouth nightly 11/26/19   Helder Worley MD       Allergies:  Aspirin    Social History:      The patient currently lives [facility]    TOBACCO:   reports that he has never smoked. He has never used smokeless tobacco.  ETOH:   reports current alcohol use.      Family History:     POOR HISTORIAN    REVIEW OF SYSTEMS:   Pertinent positives as noted in the HPI. All other systems reviewed and negative.    PHYSICAL EXAM:    /70   Pulse 70   Temp 98 °F (36.7 °C)   Resp 16   SpO2 93%     General appearance:  No apparent distress, appears stated age.  HEENT:  Normal cephalic, atraumatic without obvious deformity. Pupils equal, round, and reactive to light.  Extra ocular muscles intact. Conjunctivae/corneas clear.  Neck: Supple, with full range of motion. No jugular venous distention. Trachea midline.  Respiratory:  Normal respiratory effort. Clear to auscultation,   Cardiovascular:  REG, IRREG  Abdomen: Soft, non-tender, non-distended with normal bowel sounds.  Musculoskeletal:  No clubbing, cyanosis or edema bilaterally.    Skin: smooth and dry   Neurologic:   Cranial nerves: II-XII intact,   Psychiatric:  Alert and oriented x 1        Labs:     Recent Labs     02/22/23 1837 02/23/23  0656   WBC 11.4 10.1   HGB 14.9 13.4   HCT 45.1 40.1    185     Recent Labs     02/22/23  1837 02/23/23  0656   * 145   K 3.9 3.5   * 109*   CO2 26 25   BUN 28* 21   CREATININE 1.4* 1.2   CALCIUM 10.0 8.8     Recent Labs     02/22/23 1837   AST 33   ALT 18   BILITOT 1.4*   ALKPHOS 58     No results for input(s): INR in the last 72 hours.  No results for input(s): CKTOTAL, TROPONINI in the last 72 hours.    Urinalysis:      Lab Results   Component Value Date/Time    NITRU Negative 02/22/2023 06:37 PM  [address] 0-1 02/22/2023 06:37 PM    BACTERIA MODERATE 02/22/2023 06:37 PM    RBCUA 0-1 02/22/2023 06:37 PM    BLOODU SMALL 02/22/2023 06:37 PM    SPECGRAV 1.025 02/22/2023 06:37 PM    GLUCOSEU 250 02/22/2023 06:37 PM       Radiology:           XR CHEST PORTABLE   Final Result   No acute process. ASSESSMENT:    Active Hospital Problems    Diagnosis Date Noted    Dehydration with hypernatremia [E86.0, E87.0] 02/22/2023     Priority: Medium   DEMENTIA   HTN   TREMOR   ORAL THRUSH      PLAN:  NEUROLOGY   Ari Sagastume      DVT Prophylaxis:  HEPARIN  Diet: ADULT DIET; Regular  Code Status: Full Code    PT/OT Eval Status:  ORDERED    Dispo -  [facility] VS [facility]    Electronically signed by Deanne Rosales DO on 2/23/2023 at 3:53 PM [facility]       Thank you Yulissa Coy MD for the opportunity to be involved in this patient's care.  If you have any questions or concerns please feel free to contact me at [phone]

## 2023-02-23 NOTE — CARE COORDINATION
Social Work/ Transition of Care:    Pt presents to the ED secondary to weakness, difficulty ambulating and poor oral intake for 1 week. Pt sent to the ED from [facility]. Pt is admitted inpatient with dehydration with hypernatremia. SONJA met with pt and wife. Pt was alert and oriented x1. Pt's wife reports pt has been at [facility] since 2/17. Pt has recently been in a wheelchair as he has had difficulty ambulating. Pt's PCP is Dr Dory Guthrie, the physician at the [facility]. Pt recently had a short rehab stay at [facility] and then transitioned to [facility]. Pt's wife reports tentative plan will be for pt to return to [facility] if able and if not, he would go back to [facility]. SONJA left voice message for liaison. SONJA/ SYLVIA to follow up with pt and wife after PT/OT evals for final discharge plans.

## 2023-02-23 NOTE — ED NOTES
Patient laying in bed calm and cooperative, wife at bedside. No distress noted.  A&O x 1     Mendel Bares, RN  02/23/23 4641

## 2023-02-23 NOTE — PLAN OF CARE
Problem: Skin/Tissue Integrity  Goal: Absence of new skin breakdown  Description: 1. Monitor for areas of redness and/or skin breakdown  2. Assess vascular access sites hourly  3. Every 4-6 hours minimum:  Change oxygen saturation probe site  4. Every 4-6 hours:  If on nasal continuous positive airway pressure, respiratory therapy assess nares and determine need for appliance change or resting period. Outcome: Progressing     Problem: Safety - Adult  Goal: Free from fall injury  Outcome: Progressing     Problem: Confusion  Goal: Confusion, delirium, dementia, or psychosis is improved or at baseline  Description: INTERVENTIONS:  1. Assess for possible contributors to thought disturbance, including medications, impaired vision or hearing, underlying metabolic abnormalities, dehydration, psychiatric diagnoses, and notify attending LIP  2. [place] high risk fall precautions, as indicated  3. Provide frequent short contacts to provide reality reorientation, refocusing and direction  4. Decrease environmental stimuli, including noise as appropriate  5. Monitor and intervene to maintain adequate nutrition, hydration, elimination, sleep and activity  6. If unable to ensure safety without constant attention obtain sitter and review sitter guidelines with assigned personnel  7.  Initiate Psychosocial CNS and Spiritual Care consult, as indicated  Outcome: Progressing

## 2023-02-24 ENCOUNTER — APPOINTMENT (OUTPATIENT)
Dept: CT IMAGING | Age: 83
DRG: 641 | End: 2023-02-24
Payer: MEDICARE

## 2023-02-24 PROBLEM — G93.89 CEREBRAL VENTRICULOMEGALY: Status: ACTIVE | Noted: 2023-02-24

## 2023-02-24 PROBLEM — F03.94 DEMENTIA WITH ANXIETY: Status: ACTIVE | Noted: 2023-02-24

## 2023-02-24 PROBLEM — R29.898 WEAKNESS OF BOTH LOWER EXTREMITIES: Status: ACTIVE | Noted: 2023-02-24

## 2023-02-24 LAB
ALBUMIN SERPL-MCNC: 3.4 G/DL (ref 3.5–5.2)
ALP BLD-CCNC: 62 U/L (ref 40–129)
ALT SERPL-CCNC: 24 U/L (ref 0–40)
AMMONIA: 26 UMOL/L (ref 16–60)
ANION GAP SERPL CALCULATED.3IONS-SCNC: 14 MMOL/L (ref 7–16)
AST SERPL-CCNC: 41 U/L (ref 0–39)
BILIRUB SERPL-MCNC: 1.1 MG/DL (ref 0–1.2)
BILIRUBIN DIRECT: 0.3 MG/DL (ref 0–0.3)
BILIRUBIN, INDIRECT: 0.8 MG/DL (ref 0–1)
BUN BLDV-MCNC: 19 MG/DL (ref 6–23)
CALCIUM SERPL-MCNC: 8.9 MG/DL (ref 8.6–10.2)
CHLORIDE BLD-SCNC: 105 MMOL/L (ref 98–107)
CO2: 26 MMOL/L (ref 22–29)
CREAT SERPL-MCNC: 1.3 MG/DL (ref 0.7–1.2)
FOLATE: 13.7 NG/ML (ref 4.8–24.2)
FOLATE: 8.3 NG/ML (ref 4.8–24.2)
GFR SERPL CREATININE-BSD FRML MDRD: 55 ML/MIN/1.73
GLUCOSE BLD-MCNC: 121 MG/DL (ref 74–99)
POTASSIUM SERPL-SCNC: 3.2 MMOL/L (ref 3.5–5)
SODIUM BLD-SCNC: 145 MMOL/L (ref 132–146)
TOTAL PROTEIN: 6.9 G/DL (ref 6.4–8.3)
TSH SERPL DL<=0.05 MIU/L-ACNC: 2.7 UIU/ML (ref 0.27–4.2)
VITAMIN B-12: 304 PG/ML (ref 211–946)
VITAMIN B-12: 319 PG/ML (ref 211–946)

## 2023-02-24 PROCEDURE — 6360000002 HC RX W HCPCS: Performed by: NURSE PRACTITIONER

## 2023-02-24 PROCEDURE — 2580000003 HC RX 258: Performed by: NURSE PRACTITIONER

## 2023-02-24 PROCEDURE — 2060000000 HC ICU INTERMEDIATE R&B

## 2023-02-24 PROCEDURE — 36415 COLL VENOUS BLD VENIPUNCTURE: CPT

## 2023-02-24 PROCEDURE — 94640 AIRWAY INHALATION TREATMENT: CPT

## 2023-02-24 PROCEDURE — 84443 ASSAY THYROID STIM HORMONE: CPT

## 2023-02-24 PROCEDURE — 80076 HEPATIC FUNCTION PANEL: CPT

## 2023-02-24 PROCEDURE — 92610 EVALUATE SWALLOWING FUNCTION: CPT

## 2023-02-24 PROCEDURE — 6370000000 HC RX 637 (ALT 250 FOR IP): Performed by: INTERNAL MEDICINE

## 2023-02-24 PROCEDURE — 6370000000 HC RX 637 (ALT 250 FOR IP): Performed by: NURSE PRACTITIONER

## 2023-02-24 PROCEDURE — 80048 BASIC METABOLIC PNL TOTAL CA: CPT

## 2023-02-24 PROCEDURE — 97530 THERAPEUTIC ACTIVITIES: CPT

## 2023-02-24 PROCEDURE — 72131 CT LUMBAR SPINE W/O DYE: CPT

## 2023-02-24 PROCEDURE — 82140 ASSAY OF AMMONIA: CPT

## 2023-02-24 PROCEDURE — 82746 ASSAY OF FOLIC ACID SERUM: CPT

## 2023-02-24 PROCEDURE — 97161 PT EVAL LOW COMPLEX 20 MIN: CPT

## 2023-02-24 PROCEDURE — 92526 ORAL FUNCTION THERAPY: CPT

## 2023-02-24 PROCEDURE — 82607 VITAMIN B-12: CPT

## 2023-02-24 PROCEDURE — 99222 1ST HOSP IP/OBS MODERATE 55: CPT | Performed by: NEUROLOGICAL SURGERY

## 2023-02-24 PROCEDURE — 99222 1ST HOSP IP/OBS MODERATE 55: CPT | Performed by: NURSE PRACTITIONER

## 2023-02-24 RX ORDER — POTASSIUM CHLORIDE 20 MEQ/1
40 TABLET, EXTENDED RELEASE ORAL 2 TIMES DAILY WITH MEALS
Status: DISCONTINUED | OUTPATIENT
Start: 2023-02-24 | End: 2023-02-24

## 2023-02-24 RX ADMIN — CLOTRIMAZOLE 10 MG: 10 LOZENGE ORAL at 22:48

## 2023-02-24 RX ADMIN — POTASSIUM BICARBONATE 40 MEQ: 782 TABLET, EFFERVESCENT ORAL at 21:26

## 2023-02-24 RX ADMIN — MEMANTINE 5 MG: 5 TABLET ORAL at 22:32

## 2023-02-24 RX ADMIN — AMLODIPINE BESYLATE 5 MG: 5 TABLET ORAL at 09:10

## 2023-02-24 RX ADMIN — Medication 1 TABLET: at 09:08

## 2023-02-24 RX ADMIN — HEPARIN SODIUM 5000 UNITS: 10000 INJECTION INTRAVENOUS; SUBCUTANEOUS at 21:27

## 2023-02-24 RX ADMIN — HEPARIN SODIUM 5000 UNITS: 10000 INJECTION INTRAVENOUS; SUBCUTANEOUS at 12:17

## 2023-02-24 RX ADMIN — SODIUM CHLORIDE, PRESERVATIVE FREE 10 ML: 5 INJECTION INTRAVENOUS at 09:10

## 2023-02-24 RX ADMIN — ALBUTEROL SULFATE 2.5 MG: 2.5 SOLUTION RESPIRATORY (INHALATION) at 11:39

## 2023-02-24 RX ADMIN — CLOTRIMAZOLE 10 MG: 10 LOZENGE ORAL at 18:31

## 2023-02-24 RX ADMIN — POTASSIUM CHLORIDE 40 MEQ: 1500 TABLET, EXTENDED RELEASE ORAL at 12:17

## 2023-02-24 RX ADMIN — METOPROLOL SUCCINATE 200 MG: 100 TABLET, EXTENDED RELEASE ORAL at 09:08

## 2023-02-24 RX ADMIN — BUDESONIDE 500 MCG: 0.5 SUSPENSION RESPIRATORY (INHALATION) at 19:32

## 2023-02-24 RX ADMIN — CLOTRIMAZOLE 10 MG: 10 LOZENGE ORAL at 15:49

## 2023-02-24 RX ADMIN — ALBUTEROL SULFATE 2.5 MG: 2.5 SOLUTION RESPIRATORY (INHALATION) at 19:32

## 2023-02-24 RX ADMIN — CLOTRIMAZOLE 10 MG: 10 LOZENGE ORAL at 12:17

## 2023-02-24 RX ADMIN — DONEPEZIL HYDROCHLORIDE 10 MG: 5 TABLET ORAL at 21:26

## 2023-02-24 RX ADMIN — ALBUTEROL SULFATE 2.5 MG: 2.5 SOLUTION RESPIRATORY (INHALATION) at 16:05

## 2023-02-24 RX ADMIN — MEMANTINE 5 MG: 5 TABLET ORAL at 09:07

## 2023-02-24 RX ADMIN — CLOTRIMAZOLE 10 MG: 10 LOZENGE ORAL at 09:10

## 2023-02-24 RX ADMIN — MONTELUKAST 10 MG: 10 TABLET, FILM COATED ORAL at 21:26

## 2023-02-24 ASSESSMENT — PAIN SCALES - GENERAL
PAINLEVEL_OUTOF10: 0

## 2023-02-24 NOTE — PROGRESS NOTES
Hospitalist Progress Note      PCP: Valorie Webster MD    Date of Admission: 2/22/2023        Hospital Course:  [de-identified] y.o. male presented with   CONFUSION, HE LIVES IN [facility], AND THE WIFE STATES THEY SPENT 2 MONTHS IN [place] WITH THEIR DAUGHTER, SHE NOTICED A MENTAL AND PHYSICAL DECLINE, THAT HAS GOTTEN PROGRESSIVELY WORSE, AND HE HAS DEVELOPED A TREMOR OF HIS HAND AND FEET. BROUGHT TO ER FOUND TO BE HYPERNATREMIC,   HE HAS EDEMA OF LEGS** HE MRI SUGGEST NPH, ** AND HE HAS ATAXIA  , US OF LEGS, NEUROSURGERY, AND CT OF LUMBAR SPINE ORDERED      Subjective:  NO COMPLAINTS           Medications:  Reviewed    Infusion Medications    sodium chloride      sodium chloride 50 mL/hr at 02/23/23 1444     Scheduled Medications    potassium chloride  40 mEq Oral BID WC    sodium chloride flush  5-40 mL IntraVENous 2 times per day    heparin (porcine)  5,000 Units SubCUTAneous Q8H    albuterol  2.5 mg Nebulization 4x daily    amLODIPine  5 mg Oral Daily    budesonide  500 mcg Nebulization BID    donepezil  10 mg Oral Nightly    memantine  5 mg Oral BID    montelukast  10 mg Oral Nightly    multivitamin  1 tablet Oral Daily    metoprolol succinate  200 mg Oral Daily    clotrimazole  10 mg Oral 5x Daily     PRN Meds: sodium chloride flush, sodium chloride, ondansetron **OR** ondansetron, acetaminophen **OR** acetaminophen, bisacodyl, albuterol      Intake/Output Summary (Last 24 hours) at 2/24/2023 1525  Last data filed at 2/24/2023 1255  Gross per 24 hour   Intake 840 ml   Output 400 ml   Net 440 ml       Exam:    /82   Pulse 87   Temp 98.4 °F (36.9 °C)   Resp 16   Ht 5' 11\" (1.803 m)   Wt 198 lb (89.8 kg)   SpO2 92%   BMI 27.62 kg/m²       General appearance:  No apparent distress, appears stated age. HEENT:  Normal cephalic,   Neck: Supple, with full range of motion. No jugular venous distention. Trachea midline. Respiratory:  Normal respiratory effort.  Clear to auscultation,   Cardiovascular: REG, IRREG  Abdomen: Soft, non-tender, non-distended with normal bowel sounds. Musculoskeletal:  No clubbing, cyanosis  POS edema bilaterally. Skin: smooth and dry   Neurologic:   Cranial nerves: II-XII intact,   Psychiatric:  Alert and oriented x 1                Labs:   Recent Labs     02/22/23  1837 02/23/23  0656   WBC 11.4 10.1   HGB 14.9 13.4   HCT 45.1 40.1    185     Recent Labs     02/22/23  1837 02/23/23  0656 02/24/23  0440   * 145 145   K 3.9 3.5 3.2*   * 109* 105   CO2 26 25 26   BUN 28* 21 19   CREATININE 1.4* 1.2 1.3*   CALCIUM 10.0 8.8 8.9     Recent Labs     02/22/23  1837 02/24/23  1109   AST 33 41*   ALT 18 24   BILIDIR  --  0.3   BILITOT 1.4* 1.1   ALKPHOS 58 62     No results for input(s): INR in the last 72 hours. No results for input(s): Jose Goldberg in the last 72 hours. 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

## 2023-02-24 NOTE — CARE COORDINATION
Care Coordiantion  The patient is already active with [facility] home care will need resumption care of orders upon discharge.

## 2023-02-24 NOTE — PLAN OF CARE
Problem: Skin/Tissue Integrity  Goal: Absence of new skin breakdown  Description: 1. Monitor for areas of redness and/or skin breakdown  2. Assess vascular access sites hourly  3. Every 4-6 hours minimum:  Change oxygen saturation probe site  4. Every 4-6 hours:  If on nasal continuous positive airway pressure, respiratory therapy assess nares and determine need for appliance change or resting period. Outcome: Progressing     Problem: Safety - Adult  Goal: Free from fall injury  Outcome: Progressing     Problem: Confusion  Goal: Confusion, delirium, dementia, or psychosis is improved or at baseline  Description: INTERVENTIONS:  1. Assess for possible contributors to thought disturbance, including medications, impaired vision or hearing, underlying metabolic abnormalities, dehydration, psychiatric diagnoses, and notify attending LIP  2. [place] high risk fall precautions, as indicated  3. Provide frequent short contacts to provide reality reorientation, refocusing and direction  4. Decrease environmental stimuli, including noise as appropriate  5. Monitor and intervene to maintain adequate nutrition, hydration, elimination, sleep and activity  6. If unable to ensure safety without constant attention obtain sitter and review sitter guidelines with assigned personnel  7.  Initiate Psychosocial CNS and Spiritual Care consult, as indicated  Outcome: Progressing  Flowsheets (Taken 2/24/2023 7732 by Rachell Hernandez)  Effect of thought disturbance (confusion, delirium, dementia, or psychosis) are managed with adequate functional status:   Provide frequent short contacts to provide reality reorientation, refocusing and direction   Decrease environmental stimuli, including noise as appropriate   Monitor and intervene to maintain adequate nutrition, hydration, elimination, sleep and activity

## 2023-02-24 NOTE — PROGRESS NOTES
Physical Therapy  Physical Therapy Initial Assessment     Name: Jamie Lopez  : 1940  MRN: [id number]      Date of Service: 2023    Evaluating PT:  Dom Wise PT, DPJENNIFER [id number]    Room #:  7897/8607-I  Diagnosis:  Dehydration with hypernatremia [E86.0, E87.0]  PMHx/PSHx:   has a past medical history of Arm DVT (deep venous thromboembolism), acute, right ([facility] [address] Asthma, Dementia ([facility] [address] History of deep vein thrombosis, and Hypertension. has no past surgical history on file. Procedure/Surgery:  None  Precautions:  Falls, cognition, external catheter  Equipment Needs:  TBD    SUBJECTIVE:    Pt is questionable historian. Per chart, Pt is from assisted living facility. Pt reports that they ambulated with no AD PTA. OBJECTIVE:   Initial Evaluation  Date: 2023 Treatment Short Term/ Long Term   Goals   AM-PAC 6 Clicks      Was pt agreeable to Eval/treatment? Yes     Does pt have pain? Exhibited signs of BLE pain with movement     Bed Mobility  Rolling: MaxA  Supine to sit: MaxA  Sit to supine: MaxA  Scooting: NT  Rolling: SBA  Supine to sit: SBA  Sit to supine: SBA  Scooting: SBA   Transfers Sit to stand: Attempted; unable  Stand to sit: NT  Stand pivot: NT  Sit to stand: SBA  Stand to sit: SBA  Stand pivot: SBA with AAD   Ambulation    NT  150 feet with AAD SBA   Stair negotiation: ascended and descended  NT  TBD   ROM BUE:  See OT note  BLE:  WFL     Strength BUE:  See OT note  BLE:  Knee ext 2/5, ankle dorsiflex 5/5     Balance Sitting EOB:  CGA  Dynamic Standing:  NT  Sitting EOB:  Supervision  Dynamic Standing:  SBA with AAD     Pt is A & O x 3 (self, place, time - initially stated year was , corrected to )  Sensation:  Pt denies numbness/tingling to extremities  Edema:  Unremarkable    Patient education  Pt educated on role of PT, safety during functional mobility, use of call light for assistance.     Patient response to education:   Pt verbalized understanding Pt demonstrated skill Pt requires further education in this area   Yes Yes Yes     ASSESSMENT:    Conditions Requiring Skilled Therapeutic Intervention:    [x]Decreased strength     []Decreased ROM  [x]Decreased functional mobility  [x]Decreased balance   [x]Decreased endurance   [x]Decreased posture  []Decreased sensation  []Decreased coordination   []Decreased vision  [x]Decreased safety awareness   [x]Increased pain       Comments:  Patient in Swann's position upon arrival; agreeable to PT session. Required increased time, assistance of trunk and BLE to perform bed mobility. Exhibited rightward lean in sitting, requiring verbal cues and assistance to correct. Patient sat EOB for extended period of time. Sit to stand transfer attempted; unable to clear surface of bed. Upon returning to supine, rolling performed multiple times in order for pad to be replaced. Required assistance to maintain side-lying position. Patient left in Swann's position with BLE elevated, call light in reach. Instructed not to get up on own and to use call light for assistance; expressed understanding. Patient would benefit from continued skilled PT services to address functional deficits and prevent deconditioning. Treatment:  Patient practiced and was instructed in the following treatment:    Bed mobility - verbal/tactile cues to facilitate proper positioning and sequencing; physical assistance provided as needed during activity  Static/dynamic sitting - performed to promote activity tolerance, postural awareness, and balance maintenance  Functional transfer - verbal/tactile cues to facilitate proper positioning and sequencing during sit to stand transfer attempt, particularly related to hand/foot placement; physical assistance provided as needed during activity  Skilled positioning - patient positioned optimally to protect skin/joint integrity and promote comfort    Pt's/ family goals   1.  None stated    Prognosis is good for reaching above PT goals. Patient and or family understand(s) diagnosis, prognosis, and plan of care. Yes    PHYSICAL THERAPY PLAN OF CARE:    PT POC is established based on physician order and patient diagnosis     Referring provider/PT Order:    02/23/23 0415  PT eval and treat  Start:  02/23/23 0415,   End:  02/23/23 0415,   ONE TIME,   Standing Count:  1 Occurrences,   R         April [place], APRN - CNP     Diagnosis:  Dehydration with hypernatremia [E86.0, E87.0]  Specific instructions for next treatment:  Continue to facilitate safe performance of functional mobility; progress as appropriate. Current Treatment Recommendations:     [x] Strengthening to improve independence with functional mobility   [x] ROM to improve independence with functional mobility   [x] Balance Training to improve static/dynamic balance and to reduce fall risk  [x] Endurance Training to improve activity tolerance during functional mobility   [x] Transfer Training to improve safety and independence with all functional transfers   [x] Gait Training to improve gait mechanics, endurance and assess need for appropriate assistive device  [] Stair Training in preparation for safe discharge home and/or into the community   [x] Positioning to prevent skin breakdown and contractures  [x] Safety and Education Training   [x] Patient/Caregiver Education   [] HEP  [] Other     PT long term treatment goals are located in above grid    Frequency of treatments: 2-5x/week x 1-2 weeks. Time in  0830  Time out  0900    Total Treatment Time  15 minutes     Evaluation Time includes thorough review of current medical information, gathering information on past medical history/social history and prior level of function, completion of standardized testing/informal observation of tasks, assessment of data and education on plan of care and goals.     CPT codes:  [x] Low Complexity PT evaluation 53066  [] Moderate Complexity PT evaluation 17551  [] High Complexity PT evaluation [id number]  [] PT Re-evaluation [id number]  [] Gait training 56629 0 minutes  [] Manual therapy 53006 0 minutes  [x] Therapeutic activities 77370 15 minutes  [] Therapeutic exercises 60091 0 minutes  [] Neuromuscular reeducation 55153 0 minutes     Nichole Arellano, PT, DPT  [id number]

## 2023-02-24 NOTE — CONSULTS
Neuro Inpatient Consult Note       Aida Mackey is a [de-identified] y.o. right handed male     Neurology was consulted for tremor    Patient remains intermittently hypertensive otherwise vitals are stable on room air    Patient's wife is present at bedside currently    Past Medical History:     Past Medical History:   Diagnosis Date    Arm DVT (deep venous thromboembolism), acute, right (HCC)     Asthma     Dementia ([facility] [address]     History of deep vein thrombosis 8/22/2019    Hypertension        Past Surgical History:     History reviewed. No pertinent surgical history. Allergies:     Aspirin    Medications:     Prior to Admission medications    Medication Sig Start Date End Date Taking?  Authorizing Provider   albuterol sulfate HFA (PROVENTIL;VENTOLIN;PROAIR) 108 (90 Base) MCG/ACT inhaler Inhale 2 puffs into the lungs every 4 hours as needed for Wheezing   Yes Historical Provider, MD   amLODIPine (NORVASC) 5 MG tablet Take 5 mg by mouth daily   Yes Historical Provider, MD   QUEtiapine (SEROQUEL) 50 MG tablet Take 50 mg by mouth 2 times daily   Yes Historical Provider, MD   hydrOXYzine pamoate (VISTARIL) 25 MG capsule Take 25 mg by mouth 3 times daily as needed for Anxiety   Yes Historical Provider, MD   nebivolol (BYSTOLIC) 20 MG TABS tablet Take 1 tablet by mouth daily 4/8/20   Helder Worley MD   budesonide (PULMICORT) 0.5 MG/2ML nebulizer suspension Take 2 mLs by nebulization 2 times daily 1/14/20   Nima Miller MD   quinapril (ACCUPRIL) 40 MG tablet Take 1 tablet by mouth nightly 11/26/19   Helder Worley MD   montelukast (SINGULAIR) 10 MG tablet Take 1 tablet by mouth nightly 11/26/19   Nima Miller MD   memantine (NAMENDA) 5 MG tablet Take 1 tablet by mouth 2 times daily 11/26/19   Nmia Miller MD   donepezil (ARICEPT) 10 MG tablet Take 1 tablet by mouth nightly 11/26/19   Nima Miller MD       Social History:     Denies ETOH, tobacco, or illicit drugs; occasional wine    Review of Systems:     ROS is limited due to anxiousness and confusion    Family History:     History reviewed. No pertinent family history.     History of Present Illness:     Patient presented to ED on 2/22 for not eating and drinking and at least 5 days prior to arrival.  Patient has a history of dementia on both Namenda and Aricept.  Patient recently transferred from [facility] to [facility] [place]; admitted there on 2/17/2023.  Since arriving their staff has noted he has not been eating or drinking.  There was concern for dehydration and patient was sent to ED for further evaluation.  Patient's wife reported some issues with swallowing.  On arrival /78, blood glucose 126 and labs unrevealing with the exception of hypernatremia, hyperchloremia and SARAH.  Influenza A&B as well as COVID was negative.  MRI brain completed since admission shows no acute stroke, hemorrhage or mass effect; there is some concern for NPH however when compared to prior imaging this has been stable and unchanged since at least 2019.    Patient's wife states that about a month ago patient was able to complete a 1 to 2 mile marathon.  About a week ago they were in [place] and patient was able to get on and off the plane although slowly he was able to do so.  Patient's wife states that over the last week he has just steadily been declining.  Patient states he has had his tremor all his life however his wife disagrees and says that has been present for the last few weeks but has worsened within the last week; previously not an issue.  She also does not know of any family members he has with the same tremor.  ROS is limited due to patient's confusion but he denies any pain or distress at present time.      Objective:     BP (!) 147/84   Pulse 92   Temp 97.8 °F (36.6 °C)   Resp 16   Ht 5' 11\" (1.803 m)   Wt 198 lb (89.8 kg)   SpO2 94%   BMI 27.62 kg/m²     General appearance: alert, appears stated age,  and no  distress  Head: normocephalic, without obvious abnormality, atraumatic  Eyes: conjunctivae/corneas clear. .  Neck: supple, symmetrical, trachea midline and thyroid not enlarged  Lungs: Unlabored breaths on room air  Heart: regular rate and rhythm  Extremities: normal, atraumatic, no cyanosis or edema  Pulses: 2+ and symmetric  Skin: color, texture, turgor normal---no rashes or lesions    Mental Status: alert, oriented to self and situation; confused to place and time, unable to state his age, can identify his wife and provide her name at bedside today; follows simple commands with issues with more complex commands.       Decreased attention/concentration  Impaired fundus of knowledge  Memories impaired     Speech: no dysarthria  Language: no aphasias--does well with object identification and reading but struggles with repetition    Cranial Nerves:  I: smell    II: visual acuity     II: visual fields Full to confrontation   II: pupils PERRL   III,VII: ptosis None   III,IV,VI: extraocular muscles  EOMI without nystagmus    V: mastication Normal   V: facial light touch sensation  Normal   V,VII: corneal reflex     VII: facial muscle function - upper  Normal   VII: facial muscle function - lower Normal   VIII: hearing Moderately impaired   IX: soft palate elevation  Normal   IX,X: gag reflex    XI: trapezius strength  5/5   XI: sternocleidomastoid strength 5/5   XI: neck extension strength  5/5   XII: tongue strength  Normal     Motor:  4/5 handgrips  BUE grossly 5/5 without drift  BLE patient does not lift off bed and cannot hold up when lifted manually  Increased tone to legs, normal bulk    Intermittent tremor R>L--this worsens but then improved spontaneously when distracted    Cogwheeling appreciated in upper extremities    Sensory:  LT and PP normal    Coordination:   FN, FFM and STACY normal  HS unable to complete due to weakness    Gait:  Not tested due to inability    DTR:   Right Brachioradialis reflex 1+  Left Brachioradialis reflex 1+  Right Biceps reflex 1+  Left Biceps reflex 1+  Right Triceps reflex 1+  Left Triceps reflex 1+  Right Quadriceps reflex 1+  Left Quadriceps reflex 1+  Right Achilles reflex 1+  Left Achilles reflex 1+    No Babinskis  No Sellers's     No other pathological reflexes    Laboratory/Radiology:     CBC with Differential:    Lab Results   Component Value Date/Time    WBC 10.1 02/23/2023 06:56 AM    RBC 4.16 02/23/2023 06:56 AM    HGB 13.4 02/23/2023 06:56 AM    HCT 40.1 02/23/2023 06:56 AM     02/23/2023 06:56 AM    MCV 96.4 02/23/2023 06:56 AM    MCH 32.2 02/23/2023 06:56 AM    MCHC 33.4 02/23/2023 06:56 AM    RDW 12.4 02/23/2023 06:56 AM    LYMPHOPCT 13.4 02/23/2023 06:56 AM    MONOPCT 14.7 02/23/2023 06:56 AM    BASOPCT 0.2 02/23/2023 06:56 AM    MONOSABS 1.49 02/23/2023 06:56 AM    LYMPHSABS 1.35 02/23/2023 06:56 AM    EOSABS 0.00 02/23/2023 06:56 AM    BASOSABS 0.02 02/23/2023 06:56 AM     CMP:    Lab Results   Component Value Date/Time     02/24/2023 04:40 AM    K 3.2 02/24/2023 04:40 AM    K 3.5 02/23/2023 06:56 AM     02/24/2023 04:40 AM    CO2 26 02/24/2023 04:40 AM    BUN 19 02/24/2023 04:40 AM    CREATININE 1.3 02/24/2023 04:40 AM    GFRAA 60 08/07/2019 02:04 PM    LABGLOM 55 02/24/2023 04:40 AM    GLUCOSE 121 02/24/2023 04:40 AM    PROT 7.7 02/22/2023 06:37 PM    LABALBU 3.9 02/22/2023 06:37 PM    CALCIUM 8.9 02/24/2023 04:40 AM    BILITOT 1.4 02/22/2023 06:37 PM    ALKPHOS 58 02/22/2023 06:37 PM    AST 33 02/22/2023 06:37 PM    ALT 18 02/22/2023 06:37 PM     MRI BRAIN WO CONTRAST   Final Result   Prominence of the lateral ventricles and 3rd ventricle are as can be seen in   the setting of normal pressure hydrocephalus. No evidence of infarct, intracranial hemorrhage, or mass effect. Polypoidal mucosal thickening in the left nasal cavity; correlate for nasal   polyps. Paranasal sinus disease.       RECOMMENDATIONS:   Unavailable         XR CHEST PORTABLE Final Result   No acute process. I independently reviewed all pertinent labs and imaging studies today. 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 This is worsened when initially asked however minimal at other points of exam today. Cogwheeling appreciated in upper extremities with increased tone in the lower extremities. Normal TSH; other labs ordered to further evaluate    New onset lower extremity weakness   Patient's wife states he was able to walk on and off a plane slowly about a week ago; patient unable to lift his legs for me this morning. Will obtain CT lumbar spine to evaluate other etiology    History of dementia   Currently on both Namenda and Aricept    Of note patient has not eaten or drink in at least 5 days prior to arrival.  This could be contributing to patient's presentation. If CT lumbar spine as well as labs are unrevealing anticipate sign off to work-up tremor on an outpatient basis.     Plan:     Continues supportive care  Continue treating other medical issues  CT lumbar spine ordered  B12, folate, ammonia and LFTs ordered today  Continue Namenda and Aricept per home dose  Up with assistance/fall precautions  Continue telemetry  PT note review--score 8/24 today  OT and ST evaluations pending    SHANNON Fields NP   10:31 AM  2/24/2023

## 2023-02-24 NOTE — CARE COORDINATION
Care Coordination  The patient was admitted from [facility] at the [facility] assisted living. He was admitted due to dehydration and hypernatremia. His sodium today is 145 within normal limits. The patient is alert x 2 this am. Pt ampac 8/24 and ot eval is pending. Called Ellen the liason from [facility] she said they may just order home care services for the patient to return to the [facility] instead of a return to [facility]. Wilmer Marinelli is going to make a few phone calls and call this cm back with the plan. Will follow       Addendum-  Per Wilmer Marinelli this liason called back and the plan is for the patient to return to [facility] assisted living with home care upon discharge. The family is in agreement with this plan. Per Wilmer Marinelli they use [facility] home [facility]. All clinical was faxed to [facility] at fax [phone] and phone is [phone]. Await if accepted.

## 2023-02-24 NOTE — PROGRESS NOTES
Neurosurgery consult sent to Dr. Sonia Esquivel.     Electronically signed by Jo Joy RN on 2/24/2023 at 11:24 AM

## 2023-02-24 NOTE — PROGRESS NOTES
Comprehensive Nutrition Assessment    Type and Reason for Visit:  Initial, Consult, Positive Nutrition Screen (Poor intake)    Nutrition Recommendations/Plan:   Continue current diet as tolerated. Recommend and start Ensure + BID to optimize intake and monitor. Malnutrition Assessment:  Malnutrition Status: At risk for malnutrition (Comment) (02/24/23 0861)    Context:  Acute Illness     Findings of the 6 clinical characteristics of malnutrition:  Energy Intake:  75% or less of estimated energy requirements for 7 or more days  Weight Loss:  Unable to assess (2/2 lack of wt hx on file to assess)     Body Fat Loss:  Unable to assess     Muscle Mass Loss:  Unable to assess    Fluid Accumulation:  No significant fluid accumulation     Strength:  Not Performed    Nutrition Assessment:    Pt. admit due to dehydration w/ hypernatremia and difficulty ambulating + poor oral intake for 1 week. Hx of HTN,Dementia. Pt. failed bedside swallow and started on easy to chew diet until further speech eval. Will start ONS and monitor. Nutrition Related Findings:    Oriented x2, +I/O(1L), hypokalemia, hyperglycemia, GI WDL, +1 pitting edema, Wound Type: None       Current Nutrition Intake & Therapies:    Average Meal Intake: Unable to assess  Average Supplements Intake: None Ordered  ADULT DIET; Easy to Chew    Anthropometric Measures:  Height: 5' 11\" (180.3 cm)  Ideal Body Weight (IBW): 172 lbs (78 kg)    Admission Body Weight: 203 lb (92.1 kg) (2/23 BS first measured)  Current Body Weight: 198 lb (89.8 kg) (2/24 BS), 115.1 % IBW. Weight Source: Bed Scale  Current BMI (kg/m2): 27.6  Usual Body Weight:  (MARCUS d/t lack of wt hx on file to assess)     Weight Adjustment For: No Adjustment                 BMI Categories: Overweight (BMI 25.0-29. 9)    Estimated Daily Nutrient Needs:  Energy Requirements Based On: Formula  Weight Used for Energy Requirements: Current  Energy (kcal/day): 1700-2000kcal (MSJ 1.0-1.2SF)  Weight Used for Protein Requirements: Ideal  Protein (g/day): 102-117g (1.3-1.5g/kgIBW)  Method Used for Fluid Requirements: 1 ml/kcal  Fluid (ml/day): 6651-6950    Nutrition Diagnosis:   Inadequate oral intake related to inadequate protein-energy intake as evidenced by poor intake prior to admission    Nutrition Interventions:   Food and/or Nutrient Delivery: Continue Current Diet, Start Oral Nutrition Supplement (start ensure + BID)  Nutrition Education/Counseling: No recommendation at this time  Coordination of Nutrition Care: Continue to monitor while inpatient, Swallow Evaluation       Goals:     Goals: PO intake 50% or greater, by next RD assessment       Nutrition Monitoring and Evaluation:   Behavioral-Environmental Outcomes: None Identified  Food/Nutrient Intake Outcomes: Food and Nutrient Intake, Supplement Intake, Diet Advancement/Tolerance  Physical Signs/Symptoms Outcomes: Biochemical Data, Chewing or Swallowing, GI Status, Fluid Status or Edema, Nutrition Focused Physical Findings, Skin, Weight    Discharge Planning:     Too soon to determine     Adela Villela RD  Contact: ext [phone]

## 2023-02-24 NOTE — PROGRESS NOTES
SPEECH/LANGUAGE PATHOLOGY  CLINICAL ASSESSMENT OF SWALLOWING FUNCTION   and PLAN OF CARE    PATIENT NAME:  Aida Mackey  (male)     MRN:  [id number]    :  1940  ([de-identified] y.o.)  STATUS:  Inpatient: Room 4516/4516-B    TODAY'S DATE:  2023  REFERRING PROVIDER:  Gino Daley DO  SPECIFIC PROVIDER ORDER: SLP eval and treat Date of order:  23  REASON FOR REFERRAL: dysphagia   EVALUATING THERAPIST: MANAS Carrington                 RESULTS:    DYSPHAGIA DIAGNOSIS:   Clinical indicators of moderate oropharyngeal phase dysphagia       DIET RECOMMENDATIONS:  Minced and moist consistency solids (IDDSI level 5) with  honey consistency (moderately thick - IDDSI level 3)  liquids     FEEDING RECOMMENDATIONS:     Assistance level:  Stand by assistance is needed during all oral intake      Compensatory strategies recommended: Check for oral pocketing, small bites/sips      Discussed recommendations with nursing and/or faxed report to referring provider: Nursing not available, diet change recommendation placed in Physician sticky note section     SPEECH THERAPY  PLAN OF CARE   The dysphagia POC is established based on physician order, dysphagia diagnosis and results of clinical assessment     Skilled SLP intervention for dysphagia management up to 6 x per week until goals met, pt plateaus in function and/or discharged from hospital    Conditions Requiring Skilled Therapeutic Intervention for dysphagia:    Patient is performing below functional baseline d/t  current acute condition, Multiple diagnoses, multiple medications, and increased dependency upon caregivers. Specific dysphagia interventions to include:      Therapeutic exercises  Trials of upgraded diet/liquid     Specific instructions for next treatment:  initiate instruction of therapeutic exercises  and initiate instruction of compensatory strategies  Patient Treatment Goals:    Short Term Goals:  Pt will participate in ongoing evaluation of swallow function to determine when PO diet can be safely initiated  Pt will complete oral motor strength/ coordination exercises to improve bolus prep/ control and mastication with  moderate verbal prompts . Pt will complete BOTR strength/ ROM exercises to reduce pharyngeal residuals and improve epiglottic inversion moderate verbal prompts  Pt will complete laryngeal strength/ ROM therapeutic exercises to improve airway protection for the least restrictive PO diet moderate verbal prompts    Long Term Goals:   Pt will improve oropharyngeal swallow function to ensure airway protection during PO intake to maintain adequate nutrition/hydration and decrease signs/symptoms of aspiration to less than 1 x/day. Patient/family Goal:    Did not state. Will further assess during treatment. Plan of care discussed with Patient   The Patient did not demonstrate complete understanding of the diagnosis, prognosis and plan of care     Rehabilitation Potential/Prognosis: good                    ADMITTING DIAGNOSIS: Dehydration with hypernatremia [E86.0, E87.0]    VISIT DIAGNOSIS:      PATIENT REPORT/COMPLAINT: denies difficulty swallowing  nursing not available at time of evaluation chart reviewed and patient is not NPO for any procedures per current documentation.      PRIOR LEVEL OF SWALLOW FUNCTION:    PAST HISTORY OF DYSPHAGIA?: none reported    Home diet: Soft and bite size consistency solids (IDDSI level 6) with  thin liquids (IDDSI level 0)  Current Diet Order: ADULT DIET; Easy to Chew  ADULT ORAL NUTRITION SUPPLEMENT; Breakfast, Dinner; Standard High Calorie/High Protein Oral Supplement    PROCEDURE:  Consistencies Administered During the Evaluation   Liquids: thin liquid, nectar thick liquid, and honey thick liquid   Solids:  pureed foods, soft solid foods, and solid foods      Method of Intake:   cup, spoon  Fed by clinician      Position:   Seated, upright    CLINICAL ASSESSMENT:  Oral Stage:       Impaired oral initiation  Decreased mastication due to:  cognitive function and disorganized chewing pattern  Delayed A-P transit due to: reduced lingual strength  and cognitive function       Pharyngeal Stage:    Latent wet cough was noted after presentation of thin liquid and nectar consistency liquid  Wet respirations were noted after presentation of thin liquid and nectar consistency liquid  Wet/gurgly vocal quality was noted after presentation of thin liquid and nectar consistency liquid  Delayed initiation of the pharyngeal swallow noted    Cognition:   Confusion noted    Oral Peripheral Examination   Generalized oral weakness    Current Respiratory Status    room air     Parameters of Speech Production  Respiration:  Adequate for speech production  Quality:   Strained and Breathy  Intensity: Quiet    Volitional Swallow: present     Volitional Cough:   present     Pain: No pain reported. EDUCATION:   The Speech Language Pathologist (SLP) completed education regarding results of evaluation and that intervention is warranted at this time. Learner: Patient  Education: Reviewed results and recommendations of this evaluation and Reviewed diet and strategies  Evaluation of Education:  Needs further instruction    This plan may be re-evaluated and revised as warranted. Evaluation Time includes thorough review of current medical information, gathering information on past medical history/social history and prior level of function, completion of standardized testing/informal observation of tasks, assessment of data and education on plan of care and goals. [x]The admitting diagnosis and active problem list, have been reviewed prior to initiation of this evaluation.         ACTIVE PROBLEM LIST:   Patient Active Problem List   Diagnosis    Acute deep vein thrombosis (DVT) of brachial vein of right upper extremity (HCC)    History of deep vein thrombosis    Dehydration with hypernatremia    Weakness of both lower extremities Dementia with anxiety         CPT code:  32464  bedside swallow eval

## 2023-02-25 PROBLEM — R25.1 TREMORS OF NERVOUS SYSTEM: Status: ACTIVE | Noted: 2023-02-25

## 2023-02-25 LAB
ANION GAP SERPL CALCULATED.3IONS-SCNC: 9 MMOL/L (ref 7–16)
BUN BLDV-MCNC: 18 MG/DL (ref 6–23)
CALCIUM SERPL-MCNC: 8.3 MG/DL (ref 8.6–10.2)
CHLORIDE BLD-SCNC: 100 MMOL/L (ref 98–107)
CO2: 26 MMOL/L (ref 22–29)
CREAT SERPL-MCNC: 1.1 MG/DL (ref 0.7–1.2)
GFR SERPL CREATININE-BSD FRML MDRD: >60 ML/MIN/1.73
GLUCOSE BLD-MCNC: 126 MG/DL (ref 74–99)
POTASSIUM SERPL-SCNC: 3.9 MMOL/L (ref 3.5–5)
SODIUM BLD-SCNC: 135 MMOL/L (ref 132–146)

## 2023-02-25 PROCEDURE — 6370000000 HC RX 637 (ALT 250 FOR IP): Performed by: NURSE PRACTITIONER

## 2023-02-25 PROCEDURE — 6360000002 HC RX W HCPCS: Performed by: NURSE PRACTITIONER

## 2023-02-25 PROCEDURE — 2060000000 HC ICU INTERMEDIATE R&B

## 2023-02-25 PROCEDURE — 99232 SBSQ HOSP IP/OBS MODERATE 35: CPT | Performed by: NURSE PRACTITIONER

## 2023-02-25 PROCEDURE — 6370000000 HC RX 637 (ALT 250 FOR IP): Performed by: INTERNAL MEDICINE

## 2023-02-25 PROCEDURE — 2580000003 HC RX 258: Performed by: NURSE PRACTITIONER

## 2023-02-25 PROCEDURE — 80048 BASIC METABOLIC PNL TOTAL CA: CPT

## 2023-02-25 PROCEDURE — 36415 COLL VENOUS BLD VENIPUNCTURE: CPT

## 2023-02-25 PROCEDURE — 94640 AIRWAY INHALATION TREATMENT: CPT

## 2023-02-25 RX ADMIN — ALBUTEROL SULFATE 2.5 MG: 2.5 SOLUTION RESPIRATORY (INHALATION) at 16:34

## 2023-02-25 RX ADMIN — MEMANTINE 5 MG: 5 TABLET ORAL at 22:19

## 2023-02-25 RX ADMIN — POTASSIUM BICARBONATE 40 MEQ: 782 TABLET, EFFERVESCENT ORAL at 22:19

## 2023-02-25 RX ADMIN — CLOTRIMAZOLE 10 MG: 10 LOZENGE ORAL at 22:19

## 2023-02-25 RX ADMIN — HEPARIN SODIUM 5000 UNITS: 10000 INJECTION INTRAVENOUS; SUBCUTANEOUS at 12:18

## 2023-02-25 RX ADMIN — CLOTRIMAZOLE 10 MG: 10 LOZENGE ORAL at 08:44

## 2023-02-25 RX ADMIN — Medication 1 TABLET: at 08:44

## 2023-02-25 RX ADMIN — SODIUM CHLORIDE: 4.5 INJECTION, SOLUTION INTRAVENOUS at 07:08

## 2023-02-25 RX ADMIN — BUDESONIDE 500 MCG: 0.5 SUSPENSION RESPIRATORY (INHALATION) at 16:35

## 2023-02-25 RX ADMIN — ALBUTEROL SULFATE 2.5 MG: 2.5 SOLUTION RESPIRATORY (INHALATION) at 08:11

## 2023-02-25 RX ADMIN — MONTELUKAST 10 MG: 10 TABLET, FILM COATED ORAL at 22:19

## 2023-02-25 RX ADMIN — POTASSIUM BICARBONATE 40 MEQ: 782 TABLET, EFFERVESCENT ORAL at 08:44

## 2023-02-25 RX ADMIN — METOPROLOL SUCCINATE 200 MG: 100 TABLET, EXTENDED RELEASE ORAL at 08:47

## 2023-02-25 RX ADMIN — CLOTRIMAZOLE 10 MG: 10 LOZENGE ORAL at 15:49

## 2023-02-25 RX ADMIN — MEMANTINE 5 MG: 5 TABLET ORAL at 08:48

## 2023-02-25 RX ADMIN — CLOTRIMAZOLE 10 MG: 10 LOZENGE ORAL at 12:18

## 2023-02-25 RX ADMIN — SODIUM CHLORIDE, PRESERVATIVE FREE 10 ML: 5 INJECTION INTRAVENOUS at 08:46

## 2023-02-25 RX ADMIN — AMLODIPINE BESYLATE 5 MG: 5 TABLET ORAL at 08:44

## 2023-02-25 RX ADMIN — HEPARIN SODIUM 5000 UNITS: 10000 INJECTION INTRAVENOUS; SUBCUTANEOUS at 22:21

## 2023-02-25 RX ADMIN — BUDESONIDE 500 MCG: 0.5 SUSPENSION RESPIRATORY (INHALATION) at 08:12

## 2023-02-25 RX ADMIN — DONEPEZIL HYDROCHLORIDE 10 MG: 5 TABLET ORAL at 22:19

## 2023-02-25 RX ADMIN — ALBUTEROL SULFATE 2.5 MG: 2.5 SOLUTION RESPIRATORY (INHALATION) at 11:59

## 2023-02-25 ASSESSMENT — PAIN SCALES - GENERAL
PAINLEVEL_OUTOF10: 0
PAINLEVEL_OUTOF10: 0

## 2023-02-25 NOTE — CONSULTS
[address]                  [address] [facility],  [facility]                                  CONSULTATION    PATIENT NAME: Karla De La Rosa                   :        1940  MED REC NO:   [id number]                            ROOM:       4516  ACCOUNT NO:   [de-identified]                           ADMIT DATE: 2023  PROVIDER:     Bill Sena MD    CONSULT DATE:  2023    REASON FOR CONSULTATION:  1. Ventriculomegaly. 2.  Possible normal-pressure hydrocephalus. HISTORY OF PRESENT ILLNESS:  The patient is an 72-year-old gentleman who  presented to the hospital two days ago with some confusion. He is a  resident of assisted-living facility and over the last several months,  he has had some progressive decline in both physical and mental status. He has also developed tremor in his bilateral upper and lower  extremities. Upon arrival to the [facility], he was found  to be hyponatremic. Part of his workup included an MRI of his brain  that showed ventriculomegaly suspicious for normal-pressure  hydrocephalus. The patient is a poor historian. Rest of the history is  obtained from medical record as he states he does not know why he is in  the hospital.    PAST MEDICAL HISTORY:  Positive for asthma, dementia, DVT, and  hypertension. PAST SURGICAL HISTORY:  Noncontributory. FAMILY HISTORY:  Noncontributory. SOCIAL HISTORY:  Negative for tobacco use. Does consume alcoholic  beverages socially. ALLERGIES:  Include ASPIRIN. HOME MEDICATIONS:  Include Namenda and Toprol. REVIEW OF SYSTEMS:  HEENT:  Negative for headache, double vision, or blurry vision. CARDIOVASCULAR:  Negative for chest pain, arrhythmia, or palpitations. RESPIRATORY:  Negative for shortness of breath, asthma, bronchitis, or  pneumonia. GASTROINTESTINAL:  Negative for heartburn, nausea, vomiting, diarrhea,  or constipation.   GENITOURINARY: Negative for hematuria or dysuria. HEMATOLOGIC:  Negative for easy bleeding or bruising. INFECTIOUS DISEASES:  Negative for any recent infection. MUSCULOSKELETAL:  Positive for joint stiffness and swelling. PSYCHIATRIC:  Negative for anxiety or depression. NEUROLOGIC:  Negative for seizure or stroke. ENDOCRINE:  Negative for thyroid disorder or diabetes. PHYSICAL EXAMINATION:  VITAL SIGNS:  He is currently afebrile with a T-current of 36.6 degrees  Celsius, pulse 87, blood pressure 148/87, respiratory rate 16. GENERAL:  He is resting in bed. Does not appear to be in any acute  distress, appears stated age. HEENT:  His head is normocephalic and atraumatic. Pupils are 4 to 3 mm  and reactive. He has no drainage out of his eyes, ears, nose, or  throat. SKIN:  His skin is warm and dry. MUSCULOSKELETAL:  He has had increased tone in his bilateral lower  extremities and tremor in his bilateral upper and lower extremities,  worse on the right side. NEUROLOGIC:  Rest of his neurologic exam, he is awake, alert, and  oriented to person and place. Disoriented to time. Cranial nerves II  through XII are intact bilaterally. Motor exam reveals 4/5 strength in  his bilateral upper extremities and his bilateral lower extremities. He  does not follow commands and appears almost apraxic but does wiggle his  toes intermittently. Sensation is grossly intact to light touch. Reflexes are 1+ and symmetric. Toes are going down. LABORATORY DATA:  He has a sodium of 145, potassium 3.2, BUN 19,  creatinine 1.3. IMAGING:  An MRI of his brain shows ventriculomegaly. ASSESSMENT AND PLAN:  The patient is an 72-year-old gentleman with  ventriculomegaly. There is some question as to whether he could have  normal-pressure hydrocephalus. However, he does have symptoms that are  not consistent with normal-pressure hydrocephalus such as apraxia and  also tremors.   From a neurosurgical standpoint, I feel that we could  bring him back to the hospital electively down the road to consider  lumbar drain trial.  Given the fact that there are multiple issues being  addressed at this time, we want to perform a lumbar drain trial when  there are not any other confounding factors that could confuse the  results of the trial.  We will discuss this with the patient and his  family.         Erika Martinez MD    D: 02/24/2023 19:21:32       T: 02/24/2023 19:24:14     [email or web address]  Job#: [id number]     Doc#: [id number]    CC:

## 2023-02-25 NOTE — PROGRESS NOTES
Hospitalist Progress Note      PCP: Pilar Tyson MD    Date of Admission: 2/22/2023        Hospital Course:  [de-identified] y.o. male presented with   CONFUSION, HE LIVES IN [facility], AND THE WIFE STATES THEY SPENT 2 MONTHS IN [place] WITH THEIR DAUGHTER, SHE NOTICED A MENTAL AND PHYSICAL DECLINE, THAT HAS GOTTEN PROGRESSIVELY WORSE, AND HE HAS DEVELOPED A TREMOR OF HIS HAND AND FEET. BROUGHT TO ER FOUND TO BE HYPERNATREMIC,   HE HAS EDEMA OF LEGS** HE MRI SUGGEST NPH, ** AND HE HAS ATAXIA  , US OF LEGS, NEUROSURGERY, AND CT OF LUMBAR SPINE ORDERED      Subjective:     Resting in bed  Spoke with wife at bedside           Medications:  Reviewed    Infusion Medications    sodium chloride      sodium chloride 50 mL/hr at 02/25/23 0708     Scheduled Medications    potassium bicarb-citric acid  40 mEq Oral BID    sodium chloride flush  5-40 mL IntraVENous 2 times per day    heparin (porcine)  5,000 Units SubCUTAneous Q8H    albuterol  2.5 mg Nebulization 4x daily    amLODIPine  5 mg Oral Daily    budesonide  500 mcg Nebulization BID    donepezil  10 mg Oral Nightly    memantine  5 mg Oral BID    montelukast  10 mg Oral Nightly    multivitamin  1 tablet Oral Daily    metoprolol succinate  200 mg Oral Daily    clotrimazole  10 mg Oral 5x Daily     PRN Meds: sodium chloride flush, sodium chloride, ondansetron **OR** ondansetron, acetaminophen **OR** acetaminophen, bisacodyl, albuterol      Intake/Output Summary (Last 24 hours) at 2/25/2023 1325  Last data filed at 2/25/2023 0846  Gross per 24 hour   Intake 970 ml   Output 975 ml   Net -5 ml         Exam:    /74   Pulse 97   Temp 97.7 °F (36.5 °C) (Oral)   Resp 19   Ht 5' 11\" (1.803 m)   Wt 198 lb (89.8 kg)   SpO2 94%   BMI 27.62 kg/m²       General appearance:  No apparent distress, appears stated age. HEENT:  Normal cephalic,   Neck: Supple, with full range of motion. No jugular venous distention. Trachea midline.   Respiratory:  Normal respiratory effort. Clear to auscultation,   Cardiovascular:  REG, IRREG  Abdomen: Soft, non-tender, non-distended with normal bowel sounds. Musculoskeletal:  No clubbing, cyanosis  POS edema bilaterally. Skin: smooth and dry   Neurologic:   Cranial nerves: II-XII intact,   Psychiatric:  Alert and oriented x 1                Labs:   Recent Labs     02/22/23  1837 02/23/23  0656   WBC 11.4 10.1   HGB 14.9 13.4   HCT 45.1 40.1    185       Recent Labs     02/23/23  0656 02/24/23  0440 02/25/23  0605    145 135   K 3.5 3.2* 3.9   * 105 100   CO2 25 26 26   BUN 21 19 18   CREATININE 1.2 1.3* 1.1   CALCIUM 8.8 8.9 8.3*       Recent Labs     02/22/23  1837 02/24/23  1109   AST 33 41*   ALT 18 24   BILIDIR  --  0.3   BILITOT 1.4* 1.1   ALKPHOS 58 62       No results for input(s): INR in the last 72 hours. No results for input(s): Ti Net in the last 72 hours.   Recent Labs     02/22/23  1837 02/24/23  1109   AST 33 41*   ALT 18 24   BILIDIR  --  0.3   BILITOT 1.4* 1.1   ALKPHOS 58 62       Recent Labs     02/23/23  0656   LACTA 1.0       Lab Results   Component Value Date    LABURIC 6.8 08/07/2019     Lab Results   Component Value Date    AMMONIA 26.0 02/24/2023       Assessment:    Active Hospital Problems    Diagnosis Date Noted    Tremors of nervous system [R25.1] 02/25/2023     Priority: Medium    Weakness of both lower extremities [R29.898] 02/24/2023     Priority: Medium    Dementia with anxiety [F03.94] 02/24/2023     Priority: Medium    Cerebral ventriculomegaly [G93.89] 02/24/2023     Priority: Medium    Dehydration with hypernatremia [E86.0, E87.0] 02/22/2023     Priority: Medium   DEMENTIA   HTN   TREMOR   ORAL THRUSH   EDEMA OF LEGS  ABN MRI OF HEAD  GAIT ABNORMALITY     PLAN:    -follow up with neuro outpatient for PD work up  -Plans to go back to assisted living with Ghulam Kimball tomorrow so everything can be set up  -Monitor vitals   -Patient is stable for discharge tomorrow once arrangements made with Ghulam Kimball and JASSI. DVT Prophylaxis:  HEPARIN  Diet: ADULT DIET;  Regular  Code Status: Full Code     PT/OT Eval Status:  ORDERED     Dispo -  [facility]      Electronically signed by SHANNON Melendez CNP on 2/25/2023 at 1:25 PM

## 2023-02-25 NOTE — PROGRESS NOTES
Neuro Inpatient Follow Up       Maria Antonia Kessler is a [de-identified] y.o. right handed male     Neurology is following for tremor    Sig PMH: DVT, dementia, HTN    HPI:  Patient presented to ED on 2/22 for not eating and drinking and at least 5 days prior to arrival.  He has a history of dementia and had a transfer of nursing homes recently. Nursing home staff noted the patient's poor oral intake and there was concern for dehydration and wife reported significant decline in function and ?new onset tremors for the past few weeks. Found to have hypernatremia, hyperchloremia and SARAH. MRI was negative for new stroke but there was concern for NPH however when compared to prior imaging there was scant increase in ventriculomegaly (1 mm difference). Subjective:  CT of the lumbar spine showed multilevel spondylitic changes with some moderate disc protrusions and stenosis but no compressive lesions. Neurosurgery is considering lumbar drain trial as outpatient for possible NPH once his medical issues improve. The patient has no complaints for me today, though he is a very poor historian. Tremors are variable and he is able to hold objects without issues. No new neuro events in the past 24 hours.   Labs have been improving    No chest pain or palpitations  No SOB  No vertigo, lightheadedness or loss of consciousness  No falls, tripping or stumbling  No itching or bruising appreciated  + Tingling in both feet  + Chronic left-sided weakness  No speech or swallowing troubles    ROS otherwise negative      Current Facility-Administered Medications   Medication Dose Route Frequency Provider Last Rate Last Admin    potassium bicarb-citric acid (EFFER-K) effervescent tablet 40 mEq  40 mEq Oral BID Edmund Patton DO   40 mEq at 02/25/23 0844    sodium chloride flush 0.9 % injection 5-40 mL  5-40 mL IntraVENous 2 times per day April SHANNON Stroud - CNP   10 mL at 02/25/23 0846    sodium chloride flush 0.9 % injection 5-40 mL 5-40 mL IntraVENous PRN April Waldo-Jose F, APRN - CNP        0.9 % sodium chloride infusion   IntraVENous PRN April Storey-Jose F, APRN - CNP        ondansetron (ZOFRAN-ODT) disintegrating tablet 4 mg  4 mg Oral Q8H PRN April Waldo-Arcadia, APRN - CNP        Or    ondansetron [facility]) injection 4 mg  4 mg IntraVENous Q6H PRN April Fran-Arcadia, APRN - CNP        acetaminophen (TYLENOL) tablet 650 mg  650 mg Oral Q6H PRN April Fran-Jose F, APRN - CNP        Or    acetaminophen (TYLENOL) suppository 650 mg  650 mg Rectal Q6H PRN April Fran-Arcadia, APRN - CNP        bisacodyl (DULCOLAX) suppository 10 mg  10 mg Rectal Daily PRN April Fran-Arcadia, APRN - CNP        heparin (porcine) injection 5,000 Units  5,000 Units SubCUTAneous Q8H April Storey-Cornelius, APRN - CNP   5,000 Units at 02/24/23 2127    albuterol (PROVENTIL) nebulizer solution 2.5 mg  2.5 mg Nebulization 4x daily April Storey-Arcadia, APRN - CNP   2.5 mg at 02/25/23 0811    amLODIPine (NORVASC) tablet 5 mg  5 mg Oral Daily April Storey-Cornelius, APRN - CNP   5 mg at 02/25/23 0844    budesonide (PULMICORT) nebulizer suspension 500 mcg  500 mcg Nebulization BID April Storey-Cornelius, APRN - CNP   500 mcg at 02/25/23 7936    donepezil (ARICEPT) tablet 10 mg  10 mg Oral Nightly April WaldoIrineous, APRN - CNP   10 mg at 02/24/23 2126    memantine (NAMENDA) tablet 5 mg  5 mg Oral BID April Storey-Arcadia, APRN - CNP   5 mg at 02/25/23 0848    montelukast (SINGULAIR) tablet 10 mg  10 mg Oral Nightly April FranIrineous, APRN - CNP   10 mg at 02/24/23 2126    multivitamin 1 tablet  1 tablet Oral Daily April Storey-Jose F, APRN - CNP   1 tablet at 02/25/23 0844    0.45 % sodium chloride infusion   IntraVENous Continuous April FRANK StroudN - CNP 50 mL/hr at 02/25/23 0708 New Bag at 02/25/23 0708    albuterol (PROVENTIL) nebulizer solution 2.5 mg  2.5 mg Nebulization Q6H PRN Rosalva Due, DO metoprolol succinate (TOPROL XL) extended release tablet 200 mg  200 mg Oral Daily Jaison Vo, DO   200 mg at 02/25/23 0847    clotrimazole (MYCELEX) bobbi 10 mg  10 mg Oral 5x Daily Jaison Vo, DO   10 mg at 02/25/23 0844      Objective:     BP (!) 161/66   Pulse 94   Temp 99.2 °F (37.3 °C) (Oral)   Resp 18   Ht 5' 11\" (1.803 m)   Wt 198 lb (89.8 kg)   SpO2 94%   BMI 27.62 kg/m²     General appearance: alert, appears stated age,  and no distress  Head: normocephalic, without obvious abnormality, atraumatic  Eyes: conjunctivae/corneas clear. .  Neck: supple, symmetrical, trachea midline and thyroid not enlarged  Lungs: Unlabored breaths on room air  Heart: regular rate and rhythm  Extremities: normal, atraumatic, no cyanosis or edema  Pulses: 2+ and symmetric  Skin: color, texture, turgor normal---no rashes or lesions    Mental Status: alert, oriented to self and situation; confused to place and time, unable to state his age, can identify his wife and provide her name at bedside today; follows simple commands with issues with more complex commands.       Decreased attention/concentration  Impaired fundus of knowledge  Memories impaired     Speech: no dysarthria  Language: no aphasias--does well with object identification and reading but struggles with repetition    Cranial Nerves:  I: smell    II: visual acuity     II: visual fields Full to confrontation   II: pupils PERRL   III,VII: ptosis None   III,IV,VI: extraocular muscles  EOMI without nystagmus    V: mastication Normal   V: facial light touch sensation  Normal   V,VII: corneal reflex     VII: facial muscle function - upper  Normal   VII: facial muscle function - lower Normal   VIII: hearing Moderately impaired   IX: soft palate elevation  Normal   IX,X: gag reflex    XI: trapezius strength  5/5   XI: sternocleidomastoid strength 5/5   XI: neck extension strength  5/5   XII: tongue strength  Normal     Motor:  4/5 handgrips  BUE grossly 5/5 without drift  BLE patient does not lift off bed and cannot hold up when lifted manually  Increased tone to legs, normal bulk    Intermittent tremor R>L--this worsens but then improved spontaneously when distracted    Cogwheeling appreciated in upper extremities    Sensory:  LT and PP normal    Coordination:   FN, FFM and STACY normal  HS unable to complete due to weakness    Gait:  Not tested due to inability    DTR:   Right Brachioradialis reflex 1+  Left Brachioradialis reflex 1+  Right Biceps reflex 1+  Left Biceps reflex 1+  Right Triceps reflex 1+  Left Triceps reflex 1+  Right Quadriceps reflex 1+  Left Quadriceps reflex 1+  Right Achilles reflex 1+  Left Achilles reflex 1+    No Babinskis  No Sellers's     No other pathological reflexes    Laboratory/Radiology:     CBC with Differential:    Lab Results   Component Value Date/Time    WBC 10.1 02/23/2023 06:56 AM    RBC 4.16 02/23/2023 06:56 AM    HGB 13.4 02/23/2023 06:56 AM    HCT 40.1 02/23/2023 06:56 AM     02/23/2023 06:56 AM    MCV 96.4 02/23/2023 06:56 AM    MCH 32.2 02/23/2023 06:56 AM    MCHC 33.4 02/23/2023 06:56 AM    RDW 12.4 02/23/2023 06:56 AM    LYMPHOPCT 13.4 02/23/2023 06:56 AM    MONOPCT 14.7 02/23/2023 06:56 AM    BASOPCT 0.2 02/23/2023 06:56 AM    MONOSABS 1.49 02/23/2023 06:56 AM    LYMPHSABS 1.35 02/23/2023 06:56 AM    EOSABS 0.00 02/23/2023 06:56 AM    BASOSABS 0.02 02/23/2023 06:56 AM     CMP:    Lab Results   Component Value Date/Time     02/25/2023 06:05 AM    K 3.9 02/25/2023 06:05 AM    K 3.5 02/23/2023 06:56 AM     02/25/2023 06:05 AM    CO2 26 02/25/2023 06:05 AM    BUN 18 02/25/2023 06:05 AM    CREATININE 1.1 02/25/2023 06:05 AM    GFRAA 60 08/07/2019 02:04 PM    LABGLOM >60 02/25/2023 06:05 AM    GLUCOSE 126 02/25/2023 06:05 AM    PROT 6.9 02/24/2023 11:09 AM    LABALBU 3.4 02/24/2023 11:09 AM    CALCIUM 8.3 02/25/2023 06:05 AM    BILITOT 1.1 02/24/2023 11:09 AM    ALKPHOS 62 02/24/2023 11:09 AM    AST 41 02/24/2023 11:09 AM    ALT 24 02/24/2023 11:09 AM     Lab Results   Component Value Date    [id number] 319 02/24/2023     Lab Results   Component Value Date    FOLATE 13.7 02/24/2023     Lab Results   Component Value Date    TSH 2.700 02/24/2023     Lab Results   Component Value Date/Time    ALKPHOS 62 02/24/2023 11:09 AM    ALT 24 02/24/2023 11:09 AM    AST 41 02/24/2023 11:09 AM    PROT 6.9 02/24/2023 11:09 AM    BILITOT 1.1 02/24/2023 11:09 AM    BILIDIR 0.3 02/24/2023 11:09 AM    LABALBU 3.4 02/24/2023 11:09 AM      Ammonia normal    MRI brain: Prominence of the lateral ventricles and 3rd ventricle are as can be seen in the setting of normal pressure hydrocephalus. No evidence of infarct, intracranial hemorrhage, or mass effect. Polypoidal mucosal thickening in the left nasal cavity; correlate for nasal polyps. Paranasal sinus disease   --minimal increase in ventriculomegaly since last scans in 2019--around 1 mm    CT lumbar: No acute osseous findings of the lumbar spine. Multilevel spondylitic changes greatest at the L1-L2 level with moderate left paracentral and left lateralizing disc osteophyte complex including left subarticular recess stenosis limited on current exam with MRI considered for further evaluation of the lumbar spine as this is far more sensitive for disc pathology. I independently reviewed all pertinent labs and imaging studies today. 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 Possible NPH: Ventriculomegaly only appears 1 mm different than his scans in 2019 per Dr. Haydee Polo, and with his underlying dementia, difficult to determine clinical signs of NPH. Neurosurgery is considering a lumbar drain trial in the future.     Dementia    Plan:     -No further inpatient neuro work-up planned  -Neurosurgery following    Signing off--call or reconsult with new issues  Follow up in our office HAYDEN Berumen, APRN - CNP   9:12 AM  2/25/2023

## 2023-02-26 VITALS
TEMPERATURE: 98.9 F | DIASTOLIC BLOOD PRESSURE: 94 MMHG | SYSTOLIC BLOOD PRESSURE: 122 MMHG | BODY MASS INDEX: 27.72 KG/M2 | HEIGHT: 71 IN | OXYGEN SATURATION: 94 % | HEART RATE: 66 BPM | RESPIRATION RATE: 18 BRPM | WEIGHT: 198 LBS

## 2023-02-26 LAB
ANION GAP SERPL CALCULATED.3IONS-SCNC: 10 MMOL/L (ref 7–16)
BUN BLDV-MCNC: 23 MG/DL (ref 6–23)
CALCIUM SERPL-MCNC: 8.5 MG/DL (ref 8.6–10.2)
CHLORIDE BLD-SCNC: 99 MMOL/L (ref 98–107)
CO2: 26 MMOL/L (ref 22–29)
CREAT SERPL-MCNC: 1.1 MG/DL (ref 0.7–1.2)
GFR SERPL CREATININE-BSD FRML MDRD: >60 ML/MIN/1.73
GLUCOSE BLD-MCNC: 118 MG/DL (ref 74–99)
POTASSIUM SERPL-SCNC: 4.1 MMOL/L (ref 3.5–5)
SODIUM BLD-SCNC: 135 MMOL/L (ref 132–146)

## 2023-02-26 PROCEDURE — 97165 OT EVAL LOW COMPLEX 30 MIN: CPT

## 2023-02-26 PROCEDURE — 36415 COLL VENOUS BLD VENIPUNCTURE: CPT

## 2023-02-26 PROCEDURE — 94640 AIRWAY INHALATION TREATMENT: CPT

## 2023-02-26 PROCEDURE — 6370000000 HC RX 637 (ALT 250 FOR IP): Performed by: NURSE PRACTITIONER

## 2023-02-26 PROCEDURE — 6360000002 HC RX W HCPCS: Performed by: NURSE PRACTITIONER

## 2023-02-26 PROCEDURE — 6370000000 HC RX 637 (ALT 250 FOR IP): Performed by: INTERNAL MEDICINE

## 2023-02-26 PROCEDURE — 80048 BASIC METABOLIC PNL TOTAL CA: CPT

## 2023-02-26 RX ADMIN — POTASSIUM BICARBONATE 40 MEQ: 782 TABLET, EFFERVESCENT ORAL at 08:47

## 2023-02-26 RX ADMIN — MEMANTINE 5 MG: 5 TABLET ORAL at 08:48

## 2023-02-26 RX ADMIN — ALBUTEROL SULFATE 2.5 MG: 2.5 SOLUTION RESPIRATORY (INHALATION) at 11:50

## 2023-02-26 RX ADMIN — ALBUTEROL SULFATE 2.5 MG: 2.5 SOLUTION RESPIRATORY (INHALATION) at 07:46

## 2023-02-26 RX ADMIN — METOPROLOL SUCCINATE 200 MG: 100 TABLET, EXTENDED RELEASE ORAL at 08:48

## 2023-02-26 RX ADMIN — HEPARIN SODIUM 5000 UNITS: 10000 INJECTION INTRAVENOUS; SUBCUTANEOUS at 11:47

## 2023-02-26 RX ADMIN — BUDESONIDE 500 MCG: 0.5 SUSPENSION RESPIRATORY (INHALATION) at 07:46

## 2023-02-26 RX ADMIN — AMLODIPINE BESYLATE 5 MG: 5 TABLET ORAL at 08:47

## 2023-02-26 RX ADMIN — HEPARIN SODIUM 5000 UNITS: 10000 INJECTION INTRAVENOUS; SUBCUTANEOUS at 06:38

## 2023-02-26 RX ADMIN — Medication 1 TABLET: at 08:47

## 2023-02-26 RX ADMIN — CLOTRIMAZOLE 10 MG: 10 LOZENGE ORAL at 11:46

## 2023-02-26 RX ADMIN — CLOTRIMAZOLE 10 MG: 10 LOZENGE ORAL at 06:38

## 2023-02-26 ASSESSMENT — PAIN SCALES - GENERAL: PAINLEVEL_OUTOF10: 0

## 2023-02-26 NOTE — CARE COORDINATION
SOCIAL WORK/CASEMANAGEMENT TRANSITION OF CARE PLANNINGCoalejandra Molly Piña, [address]):  discharge order in chart yesterday. I spoke with the rn and she  said the family had to get aides in to take care of pt and that would not be in place until today. I spoke with son, ema hart, at [phone] and he said that we can set up a ambulance for around 2 p.m. and he wants a nurse/nurse for the tong at [facility] where pt resides. Maria Isabel Elias is co owner of the [address] [place] [address] [employer] . [facility] ambulance is setup for 2 p.m. rn notified.  GUSTAVO Conway  2/26/2023

## 2023-02-26 NOTE — PROGRESS NOTES
Occupational Therapy  Facility/Department: [address] PICU  Occupational Therapy Initial Assessment    Name: Vic Jones  : 1940  MRN: [id number]  Date of Service: 2023  Room: 4516B    Evaluating OT: Cinda Engel OTR/L [id number]  Referring Provider: Martir CORONA  Specific Provider Orders: 23 date  Recommended Adaptive Equipment:  TBD     Diagnosis: dehydration & hypernatremia- Had not eaten nor drank in 5 days  Pertinent Medical History: dementia, asthma, hx DVT RUE & HTN  Precautions:  Fall Risk, minced & moist & honey liquids    Assessment of current deficits   [x] Functional mobility  [x]ADLs  [x] Strength               [x]Cognition   [x] Functional transfers   [x] IADLs         [x] Safety Awareness   [x]Endurance   [x] Fine Coordination              [x] Balance      [] Vision/perception   []Sensation    [x]Gross Motor Coordination  [x] ROM  [] Delirium                   [] Motor Control     OT PLAN OF CARE   OT POC based on physician orders, patient diagnosis and results of clinical assessment    Frequency/Duration:  1-3 days/wk for 2 weeks PRN   Specific OT Treatment to include:   * Instruction/training on adapted ADL techniques and AE recommendations to increase functional independence within precautions       * Training on energy conservation strategies, correct breathing pattern and techniques to improve independence/tolerance for self-care routine  * Functional transfer/mobility training/DME recommendations for increased independence, safety, and fall prevention  * Patient/Family education to increase follow through with safety techniques and functional independence  * Recommendation of environmental modifications for increased safety with functional transfers/mobility and ADLs  *  Therapeutic exercise to improve motor endurance, ROM, and functional strength for ADLs/functional transfers  * Therapeutic activities to facilitate/challenge dynamic balance, stand tolerance for increased safety and independence with ADLs  * Therapeutic activities to facilitate gross/fine motor skills for increased independence with ADLs    Modified Muddy Scale (MRS)  Score     Description  0             No symptoms  1             No significant disability despite symptoms  2             Slight disability; able to look after own affairs  3             Moderate disability; able to ambulate without assist/ requires assist with ADLs  4             Moderate/Severe disability;requires assist to ambulate/assist with ADLs  5             Severe disability;bedridden/incontinent   6               Score:5    Home Living: Pt is a poor historian. Per chart pt resides in a NH however pt stated he lives with his wife in a 2-story home [address] & flight of steps to 2nd floor  Equipment owned: TBD  Prior Level of Function:  Unknown- needs to be clarified by facility    Pain Level: Pt did c/o pain in his BUE & BLE with movement during OT stated or noted  Cognition: A&O: 2/4; Inconsistently follows 1 step directions with  increased time; Pt is latent in his processing. Pt able to state his first name but not his age but knew his , ptread date off dry erase board. Pt read 1921. Pt stated [facility]..sore throat Roberta    Memory:  poor   Sequencing:  poor   Problem solving:  poor   Judgement/safety:  poor    Functional Assessment:  AM-PAC Daily Activity Raw Score:    Initial Eval Status  Date: 23 Treatment Status  Date: STGs=LTGs  Time Frame: 5-7 days   Feeding Max A- educated pt RN with regards to pt diet orders- minced & moist with honey thick liquids. Upon entering room pt had thin liquids in a cup with a straw.  Item removed & staff notified of thickened liquids ordered by speech pathologist on 23  Min A   Grooming Max bed level   Min A seated   UB Dressing dependent hospital gown  Mod A   LE dress Dependent L gripper socks  Mod A using AE as needed   Bathing dependent simulated- bed level  Mod A   Toileting Max A  Min A   Bed Mobility  Log roll: Max-dependent  Supine to sit: NT- pt refused & resisted     Log roll Mod A  Supine <> sit: Mod A  Scooting ModA     Functional Transfers NT      Mod A slide board trf   Functional Mobility NT     Balance   Sitting   Static- G-  Dynamic F+   Activity Tolerance P  F for a 15-20 min activity   Visual/  Perceptual Grossly intact                Hand dominance: R  UE ROM: AROM- BUE- Pt demo minimal ability to move his BUE    PROM- BUE <1/2 & pt resisted & expressed paion with activity  Strength: BUE: grossly 3/5- in all planes    Strength: B  strength F-  Fine Motor Coordination:  B hand fair-    Hearing: WFL  Sensation:  No c/o numbness/tingling light touch assessment appears WFL  Tone:  WFL  Edema: none noted                            Comments:Cleared by RN to see pt. Upon arrival, patient  and agreeable to OT session. At end of session, patient  with call light and phone within reach, all lines and tubes intact. Pt would benefit from continued  OT to increase functional independence and quality of life. Treatment: Pt required vc's, assist & increased time to respond to questions & for limited activity @ bed level. Pt required vc's/assist for sequencing/initiation of basic ADLs/bed mobility. Pt tolerated minimal activity & demo fair tolerance & require frequent stimulation to remain engaged & to respond to questions & activity @ bed level. Pt educated on proper hand placement, body mechanics  & safety during bed mobility. Pt was also educated with regards to the need to engage in functional task & activity to improve pt functional tasks to improve pt functional performance & improve her overall welfare. Pt required increased time to complete ADLs/bed mobility due to debility. Pt required rest breaks during session and educated on pursed lip breathing. Pt appeared to have tolerated session well and was pleasant.  Pt instructed on use of call light for assistance and fall prevention. Pt demo'ing fair understanding of education provided. Continue to educate.     Eval Complexity: Mod  History: Expanded chart review of medical records and additional review of physical, cognitive, or psychosocial history related to current functional performance  Exam: 5+ performance deficits  Assistance/Modification: Max/mod assistance or modifications required to perform tasks. May have comorbidities that affect occupational performance.    Rehab Potential: Good for established goals, pt. assisted in establishment of goals.    LTG: maximize independence with ADLs to return to PLOF    Patient and/or family were instructed on diagnosis, prognosis/goals and plan of care. Demonstrated F- understanding.    [] Malnutrition indicators have been identified and nursing has been notified to ensure a dietitian consult is ordered.     Evaluation time includes thorough review of current medical information, gathering information on past medical & social history & PLOF, completion of standardized testing, informal observation of tasks, consultation with other medical professions/disciplines, assessment of data & development of POC/goals.     Time In: 1115 1130    Time Out: 900       Treatment Charges: Mins Units   OT Eval Low 13571 15 1   OT Eval Medium 41381     OT Eval High 16402     OT Re-Eval 52704     Ther Ex  75043       Manual Therapy 79261       Thera Activities 72642       ADL/Home Mgt 59118     Neuro Re-ed 20374       Group Therapy        Orthotic manage/training  13533       Non-Billable Time         Jackie Wilson OTR/L [id number]

## 2023-02-27 LAB
BLOOD CULTURE, ROUTINE: NORMAL
CULTURE, BLOOD 2: NORMAL